# Patient Record
Sex: MALE | Race: BLACK OR AFRICAN AMERICAN
[De-identification: names, ages, dates, MRNs, and addresses within clinical notes are randomized per-mention and may not be internally consistent; named-entity substitution may affect disease eponyms.]

---

## 2018-01-02 ENCOUNTER — CLINICAL ADVICE (OUTPATIENT)
Age: 23
End: 2018-01-02

## 2018-04-20 ENCOUNTER — APPOINTMENT (OUTPATIENT)
Dept: FAMILY MEDICINE | Facility: CLINIC | Age: 23
End: 2018-04-20
Payer: COMMERCIAL

## 2018-04-20 VITALS
DIASTOLIC BLOOD PRESSURE: 62 MMHG | BODY MASS INDEX: 25.48 KG/M2 | SYSTOLIC BLOOD PRESSURE: 102 MMHG | RESPIRATION RATE: 12 BRPM | HEIGHT: 70 IN | HEART RATE: 78 BPM | WEIGHT: 178 LBS | OXYGEN SATURATION: 98 %

## 2018-04-20 LAB
BILIRUB UR QL STRIP: NORMAL
CLARITY UR: CLEAR
GLUCOSE UR-MCNC: NORMAL
HCG UR QL: 0.2 EU/DL
HGB UR QL STRIP.AUTO: NORMAL
KETONES UR-MCNC: NORMAL
LEUKOCYTE ESTERASE UR QL STRIP: NORMAL
NITRITE UR QL STRIP: NORMAL
PH UR STRIP: 6.5
PROT UR STRIP-MCNC: NORMAL
SP GR UR STRIP: 1.02

## 2018-04-20 PROCEDURE — 81003 URINALYSIS AUTO W/O SCOPE: CPT | Mod: QW

## 2018-04-20 PROCEDURE — 99385 PREV VISIT NEW AGE 18-39: CPT | Mod: 25

## 2018-06-25 ENCOUNTER — APPOINTMENT (OUTPATIENT)
Dept: FAMILY MEDICINE | Facility: CLINIC | Age: 23
End: 2018-06-25
Payer: COMMERCIAL

## 2018-06-25 VITALS
HEIGHT: 70 IN | SYSTOLIC BLOOD PRESSURE: 110 MMHG | BODY MASS INDEX: 24.34 KG/M2 | WEIGHT: 170 LBS | DIASTOLIC BLOOD PRESSURE: 70 MMHG

## 2018-06-25 DIAGNOSIS — Z20.9 CONTACT WITH AND (SUSPECTED) EXPOSURE TO UNSPECIFIED COMMUNICABLE DISEASE: ICD-10-CM

## 2018-06-25 DIAGNOSIS — Z00.00 ENCOUNTER FOR GENERAL ADULT MEDICAL EXAMINATION W/OUT ABNORMAL FINDINGS: ICD-10-CM

## 2018-06-25 DIAGNOSIS — R07.81 PLEURODYNIA: ICD-10-CM

## 2018-06-25 PROCEDURE — 36415 COLL VENOUS BLD VENIPUNCTURE: CPT

## 2018-06-25 PROCEDURE — 99213 OFFICE O/P EST LOW 20 MIN: CPT | Mod: 25

## 2018-06-25 NOTE — HISTORY OF PRESENT ILLNESS
[FreeTextEntry1] : Pt is here for follow up. pt gave nurse the name of pediatrician dr marks in [facility],  [facility] shot record [de-identified] : pt here for fu cpe, pt did not go for cxr, pain resolved and after one month, pain was there w deep breathing

## 2018-06-25 NOTE — PHYSICAL EXAM
[Normal Oropharynx] : the oropharynx was normal [Clear to Auscultation] : lungs were clear to auscultation bilaterally [Normal S1, S2] : normal S1 and S2

## 2018-07-17 LAB
ALBUMIN SERPL ELPH-MCNC: 4.3 G/DL
ALP BLD-CCNC: 73 U/L
ALT SERPL-CCNC: 16 U/L
ANION GAP SERPL CALC-SCNC: 10 MMOL/L
AST SERPL-CCNC: 24 U/L
BASOPHILS # BLD AUTO: 0.04 K/UL
BASOPHILS NFR BLD AUTO: 1.4 %
BILIRUB DIRECT SERPL-MCNC: 0.3 MG/DL
BILIRUB INDIRECT SERPL-MCNC: 1.5 MG/DL
BILIRUB SERPL-MCNC: 1.8 MG/DL
BUN SERPL-MCNC: 11 MG/DL
C TRACH RRNA SPEC QL NAA+PROBE: NOT DETECTED
CALCIUM SERPL-MCNC: 9.6 MG/DL
CHLORIDE SERPL-SCNC: 103 MMOL/L
CHOLEST SERPL-MCNC: 171 MG/DL
CHOLEST/HDLC SERPL: 2.1 RATIO
CO2 SERPL-SCNC: 27 MMOL/L
CREAT SERPL-MCNC: 1.32 MG/DL
EOSINOPHIL # BLD AUTO: 0.12 K/UL
EOSINOPHIL NFR BLD AUTO: 4.2 %
GLUCOSE SERPL-MCNC: 79 MG/DL
HCT VFR BLD CALC: 44.9 %
HDLC SERPL-MCNC: 80 MG/DL
HGB BLD-MCNC: 14.9 G/DL
HIV1+2 AB SPEC QL IA.RAPID: NONREACTIVE
IMM GRANULOCYTES NFR BLD AUTO: 0 %
LDLC SERPL CALC-MCNC: 82 MG/DL
LYMPHOCYTES # BLD AUTO: 1.36 K/UL
LYMPHOCYTES NFR BLD AUTO: 47.1 %
MAN DIFF?: NORMAL
MCHC RBC-ENTMCNC: 29.6 PG
MCHC RBC-ENTMCNC: 33.2 GM/DL
MCV RBC AUTO: 89.3 FL
MONOCYTES # BLD AUTO: 0.32 K/UL
MONOCYTES NFR BLD AUTO: 11.1 %
N GONORRHOEA RRNA SPEC QL NAA+PROBE: NOT DETECTED
NEUTROPHILS # BLD AUTO: 1.05 K/UL
NEUTROPHILS NFR BLD AUTO: 36.2 %
PLATELET # BLD AUTO: 191 K/UL
POTASSIUM SERPL-SCNC: 4.4 MMOL/L
PROT SERPL-MCNC: 7.2 G/DL
RBC # BLD: 5.03 M/UL
RBC # FLD: 14.2 %
SODIUM SERPL-SCNC: 140 MMOL/L
SOURCE AMPLIFICATION: NORMAL
TRIGL SERPL-MCNC: 47 MG/DL
TSH SERPL-ACNC: 2.88 UIU/ML
WBC # FLD AUTO: 2.89 K/UL

## 2019-01-08 ENCOUNTER — APPOINTMENT (OUTPATIENT)
Dept: FAMILY MEDICINE | Facility: CLINIC | Age: 24
End: 2019-01-08
Payer: COMMERCIAL

## 2019-01-08 VITALS
HEIGHT: 70 IN | BODY MASS INDEX: 24.2 KG/M2 | OXYGEN SATURATION: 99 % | SYSTOLIC BLOOD PRESSURE: 102 MMHG | WEIGHT: 169 LBS | HEART RATE: 76 BPM | RESPIRATION RATE: 12 BRPM | DIASTOLIC BLOOD PRESSURE: 62 MMHG

## 2019-01-08 DIAGNOSIS — D72.819 DECREASED WHITE BLOOD CELL COUNT, UNSPECIFIED: ICD-10-CM

## 2019-01-08 DIAGNOSIS — J06.9 ACUTE UPPER RESPIRATORY INFECTION, UNSPECIFIED: ICD-10-CM

## 2019-01-08 PROCEDURE — 99213 OFFICE O/P EST LOW 20 MIN: CPT

## 2019-01-08 NOTE — REVIEW OF SYSTEMS
[Fever] : no fever [Earache] : no earache [Chest Pain] : no chest pain [Abdominal Pain] : no abdominal pain [Negative] : Gastrointestinal [FreeTextEntry4] : pt blow out some color

## 2019-01-08 NOTE — HEALTH RISK ASSESSMENT
[No falls in past year] : Patient reported no falls in the past year [] : No [de-identified] : rare

## 2019-01-08 NOTE — HISTORY OF PRESENT ILLNESS
[FreeTextEntry1] : Pt is here for follow up. Pt had blood work in June.  Pt has no complaints at this time. [de-identified] : pt never had recurrence of cp w deep breath ,  never went for cxr, pt here for fu labs - noted wbc was sl low and cr was sl increased but egfr was good\par pt works at [employer], pt works as  and , pt notes no cp, no sob, pt is fighting a cold, blowing out some color but he is doing better, pt notes almost gone. pt here to fu

## 2019-01-08 NOTE — PHYSICAL EXAM
[No Acute Distress] : no acute distress [Normal Oropharynx] : the oropharynx was normal [Clear to Auscultation] : lungs were clear to auscultation bilaterally [Regular Rhythm] : with a regular rhythm [Soft] : abdomen soft

## 2020-12-21 PROBLEM — J06.9 ACUTE URI: Status: RESOLVED | Noted: 2019-01-08 | Resolved: 2020-12-21

## 2024-08-28 ENCOUNTER — INPATIENT (INPATIENT)
Facility: HOSPITAL | Age: 29
LOS: 7 days | Discharge: NURSING, REHAB OR LONG-TERM CARE | DRG: 885 | End: 2024-09-05
Attending: FAMILY MEDICINE | Admitting: HOSPITALIST
Payer: MEDICAID

## 2024-08-28 VITALS — WEIGHT: 179.9 LBS | HEIGHT: 69 IN

## 2024-08-28 DIAGNOSIS — R41.82 ALTERED MENTAL STATUS, UNSPECIFIED: ICD-10-CM

## 2024-08-28 LAB
ALBUMIN SERPL ELPH-MCNC: 4.5 G/DL — SIGNIFICANT CHANGE UP (ref 3.3–5.2)
ALP SERPL-CCNC: 59 U/L — SIGNIFICANT CHANGE UP (ref 40–120)
ALT FLD-CCNC: 34 U/L — SIGNIFICANT CHANGE UP
AMPHET UR-MCNC: NEGATIVE — SIGNIFICANT CHANGE UP
ANION GAP SERPL CALC-SCNC: 20 MMOL/L — HIGH (ref 5–17)
APAP SERPL-MCNC: <3 UG/ML — LOW (ref 10–26)
APPEARANCE UR: CLEAR — SIGNIFICANT CHANGE UP
AST SERPL-CCNC: 65 U/L — HIGH
BACTERIA # UR AUTO: NEGATIVE /HPF — SIGNIFICANT CHANGE UP
BARBITURATES UR SCN-MCNC: NEGATIVE — SIGNIFICANT CHANGE UP
BASOPHILS # BLD AUTO: 0 K/UL — SIGNIFICANT CHANGE UP (ref 0–0.2)
BASOPHILS NFR BLD AUTO: 0 % — SIGNIFICANT CHANGE UP (ref 0–2)
BENZODIAZ UR-MCNC: POSITIVE
BILIRUB SERPL-MCNC: 2.8 MG/DL — HIGH (ref 0.4–2)
BILIRUB UR-MCNC: ABNORMAL
BUN SERPL-MCNC: 14.3 MG/DL — SIGNIFICANT CHANGE UP (ref 8–20)
CALCIUM SERPL-MCNC: 9 MG/DL — SIGNIFICANT CHANGE UP (ref 8.4–10.5)
CAST: 23 /LPF — HIGH (ref 0–4)
CHLORIDE SERPL-SCNC: 103 MMOL/L — SIGNIFICANT CHANGE UP (ref 96–108)
CO2 SERPL-SCNC: 17 MMOL/L — LOW (ref 22–29)
COCAINE METAB.OTHER UR-MCNC: NEGATIVE — SIGNIFICANT CHANGE UP
COLOR SPEC: SIGNIFICANT CHANGE UP
CREAT SERPL-MCNC: 1.26 MG/DL — SIGNIFICANT CHANGE UP (ref 0.5–1.3)
DIFF PNL FLD: NEGATIVE — SIGNIFICANT CHANGE UP
EGFR: 79 ML/MIN/1.73M2 — SIGNIFICANT CHANGE UP
EOSINOPHIL # BLD AUTO: 0.05 K/UL — SIGNIFICANT CHANGE UP (ref 0–0.5)
EOSINOPHIL NFR BLD AUTO: 0.9 % — SIGNIFICANT CHANGE UP (ref 0–6)
ETHANOL SERPL-MCNC: <10 MG/DL — SIGNIFICANT CHANGE UP (ref 0–9)
FENTANYL UR QL SCN: NEGATIVE — SIGNIFICANT CHANGE UP
GIANT PLATELETS BLD QL SMEAR: PRESENT — SIGNIFICANT CHANGE UP
GLUCOSE SERPL-MCNC: 168 MG/DL — HIGH (ref 70–99)
GLUCOSE UR QL: NEGATIVE MG/DL — SIGNIFICANT CHANGE UP
HCT VFR BLD CALC: 41.1 % — SIGNIFICANT CHANGE UP (ref 39–50)
HGB BLD-MCNC: 14.8 G/DL — SIGNIFICANT CHANGE UP (ref 13–17)
KETONES UR-MCNC: 40 MG/DL
LEUKOCYTE ESTERASE UR-ACNC: NEGATIVE — SIGNIFICANT CHANGE UP
LYMPHOCYTES # BLD AUTO: 0.2 K/UL — LOW (ref 1–3.3)
LYMPHOCYTES # BLD AUTO: 3.5 % — LOW (ref 13–44)
MANUAL SMEAR VERIFICATION: SIGNIFICANT CHANGE UP
MCHC RBC-ENTMCNC: 30.7 PG — SIGNIFICANT CHANGE UP (ref 27–34)
MCHC RBC-ENTMCNC: 36 GM/DL — SIGNIFICANT CHANGE UP (ref 32–36)
MCV RBC AUTO: 85.3 FL — SIGNIFICANT CHANGE UP (ref 80–100)
METHADONE UR-MCNC: NEGATIVE — SIGNIFICANT CHANGE UP
MONOCYTES # BLD AUTO: 0.4 K/UL — SIGNIFICANT CHANGE UP (ref 0–0.9)
MONOCYTES NFR BLD AUTO: 6.9 % — SIGNIFICANT CHANGE UP (ref 2–14)
NEUTROPHILS # BLD AUTO: 5.11 K/UL — SIGNIFICANT CHANGE UP (ref 1.8–7.4)
NEUTROPHILS NFR BLD AUTO: 87.8 % — HIGH (ref 43–77)
NITRITE UR-MCNC: NEGATIVE — SIGNIFICANT CHANGE UP
OPIATES UR-MCNC: NEGATIVE — SIGNIFICANT CHANGE UP
PCP SPEC-MCNC: SIGNIFICANT CHANGE UP
PCP UR-MCNC: NEGATIVE — SIGNIFICANT CHANGE UP
PH UR: 6 — SIGNIFICANT CHANGE UP (ref 5–8)
PLAT MORPH BLD: NORMAL — SIGNIFICANT CHANGE UP
PLATELET # BLD AUTO: 186 K/UL — SIGNIFICANT CHANGE UP (ref 150–400)
POTASSIUM SERPL-MCNC: 3.7 MMOL/L — SIGNIFICANT CHANGE UP (ref 3.5–5.3)
POTASSIUM SERPL-SCNC: 3.7 MMOL/L — SIGNIFICANT CHANGE UP (ref 3.5–5.3)
PROT SERPL-MCNC: 7.2 G/DL — SIGNIFICANT CHANGE UP (ref 6.6–8.7)
PROT UR-MCNC: 100 MG/DL
RBC # BLD: 4.82 M/UL — SIGNIFICANT CHANGE UP (ref 4.2–5.8)
RBC # FLD: 13.2 % — SIGNIFICANT CHANGE UP (ref 10.3–14.5)
RBC BLD AUTO: NORMAL — SIGNIFICANT CHANGE UP
RBC CASTS # UR COMP ASSIST: 1 /HPF — SIGNIFICANT CHANGE UP (ref 0–4)
SALICYLATES SERPL-MCNC: <0.6 MG/DL — LOW (ref 10–20)
SODIUM SERPL-SCNC: 140 MMOL/L — SIGNIFICANT CHANGE UP (ref 135–145)
SP GR SPEC: >1.03 — HIGH (ref 1–1.03)
SQUAMOUS # UR AUTO: 2 /HPF — SIGNIFICANT CHANGE UP (ref 0–5)
THC UR QL: POSITIVE
UROBILINOGEN FLD QL: 1 MG/DL — SIGNIFICANT CHANGE UP (ref 0.2–1)
VARIANT LYMPHS # BLD: 0.9 % — SIGNIFICANT CHANGE UP (ref 0–6)
WBC # BLD: 5.82 K/UL — SIGNIFICANT CHANGE UP (ref 3.8–10.5)
WBC # FLD AUTO: 5.82 K/UL — SIGNIFICANT CHANGE UP (ref 3.8–10.5)
WBC UR QL: 5 /HPF — SIGNIFICANT CHANGE UP (ref 0–5)

## 2024-08-28 PROCEDURE — 62270 DX LMBR SPI PNXR: CPT

## 2024-08-28 PROCEDURE — 99285 EMERGENCY DEPT VISIT HI MDM: CPT | Mod: 25

## 2024-08-28 PROCEDURE — 70450 CT HEAD/BRAIN W/O DYE: CPT | Mod: 26,MC

## 2024-08-28 PROCEDURE — 99223 1ST HOSP IP/OBS HIGH 75: CPT

## 2024-08-28 PROCEDURE — 71046 X-RAY EXAM CHEST 2 VIEWS: CPT | Mod: 26

## 2024-08-28 RX ORDER — MIDAZOLAM HYDROCHLORIDE 5 MG/ML
5 INJECTION, SOLUTION INTRAMUSCULAR; INTRAVENOUS ONCE
Refills: 0 | Status: DISCONTINUED | OUTPATIENT
Start: 2024-08-28 | End: 2024-08-28

## 2024-08-28 RX ORDER — HALOPERIDOL 1 MG
5 TABLET ORAL ONCE
Refills: 0 | Status: COMPLETED | OUTPATIENT
Start: 2024-08-28 | End: 2024-08-28

## 2024-08-28 RX ORDER — KETAMINE HYDROCHLORIDE 10 MG/ML
300 INJECTION INTRAMUSCULAR; INTRAVENOUS ONCE
Refills: 0 | Status: DISCONTINUED | OUTPATIENT
Start: 2024-08-28 | End: 2024-08-28

## 2024-08-28 RX ORDER — SODIUM CHLORIDE 9 MG/ML
1000 INJECTION INTRAMUSCULAR; INTRAVENOUS; SUBCUTANEOUS ONCE
Refills: 0 | Status: COMPLETED | OUTPATIENT
Start: 2024-08-28 | End: 2024-08-28

## 2024-08-28 RX ORDER — SODIUM CHLORIDE 9 MG/ML
2000 INJECTION INTRAMUSCULAR; INTRAVENOUS; SUBCUTANEOUS ONCE
Refills: 0 | Status: COMPLETED | OUTPATIENT
Start: 2024-08-28 | End: 2024-08-28

## 2024-08-28 RX ORDER — KETAMINE HYDROCHLORIDE 10 MG/ML
200 INJECTION INTRAMUSCULAR; INTRAVENOUS ONCE
Refills: 0 | Status: DISCONTINUED | OUTPATIENT
Start: 2024-08-28 | End: 2024-08-28

## 2024-08-28 RX ORDER — PIPERACILLIN SODIUM AND TAZOBACTAM SODIUM 3; .375 G/15ML; G/15ML
3.38 INJECTION, POWDER, FOR SOLUTION INTRAVENOUS ONCE
Refills: 0 | Status: COMPLETED | OUTPATIENT
Start: 2024-08-28 | End: 2024-08-28

## 2024-08-28 RX ORDER — VANCOMYCIN/0.9 % SOD CHLORIDE 1.75G/25
1000 PLASTIC BAG, INJECTION (ML) INTRAVENOUS ONCE
Refills: 0 | Status: COMPLETED | OUTPATIENT
Start: 2024-08-28 | End: 2024-08-28

## 2024-08-28 RX ADMIN — SODIUM CHLORIDE 1000 MILLILITER(S): 9 INJECTION INTRAMUSCULAR; INTRAVENOUS; SUBCUTANEOUS at 19:03

## 2024-08-28 RX ADMIN — Medication 5 MILLIGRAM(S): at 14:50

## 2024-08-28 RX ADMIN — MIDAZOLAM HYDROCHLORIDE 5 MILLIGRAM(S): 5 INJECTION, SOLUTION INTRAMUSCULAR; INTRAVENOUS at 19:08

## 2024-08-28 RX ADMIN — Medication 5 MILLIGRAM(S): at 19:08

## 2024-08-28 RX ADMIN — MIDAZOLAM HYDROCHLORIDE 5 MILLIGRAM(S): 5 INJECTION, SOLUTION INTRAMUSCULAR; INTRAVENOUS at 14:50

## 2024-08-28 RX ADMIN — Medication 2000 MILLIGRAM(S): at 23:05

## 2024-08-28 RX ADMIN — PIPERACILLIN SODIUM AND TAZOBACTAM SODIUM 200 GRAM(S): 3; .375 INJECTION, POWDER, FOR SOLUTION INTRAVENOUS at 19:03

## 2024-08-28 RX ADMIN — KETAMINE HYDROCHLORIDE 300 MILLIGRAM(S): 10 INJECTION INTRAMUSCULAR; INTRAVENOUS at 14:56

## 2024-08-28 RX ADMIN — Medication 250 MILLIGRAM(S): at 23:06

## 2024-08-28 RX ADMIN — SODIUM CHLORIDE 2000 MILLILITER(S): 9 INJECTION INTRAMUSCULAR; INTRAVENOUS; SUBCUTANEOUS at 17:00

## 2024-08-28 RX ADMIN — KETAMINE HYDROCHLORIDE 200 MILLIGRAM(S): 10 INJECTION INTRAMUSCULAR; INTRAVENOUS at 14:59

## 2024-08-28 NOTE — ED PROVIDER NOTE - OBJECTIVE STATEMENT
29-year-old male no significant past medical history brought in by EMS for agitation.  PD received a call for unresponsive male.  Patient was found feet first in a dumpster using his shoe as a pillow.  Was unaware of his surroundings, thrashing not alert and oriented, delusional.  Was found to be eating grass as well as his air pods.  Patient required 5 mg of Versed, 5 mg of Haldol IM as well as a total of 500 mg of ketamine IM for safe evaluation.  Patient unable to provide reasonable history 29-year-old male no significant past medical history brought in by EMS for agitation.  PD received a call for unresponsive male.  Patient was found feet first in a dumpster using his shoe as a pillow.  Was unaware of his surroundings, thrashing not alert and oriented, delusional.  Was found to be eating grass as well as his air pods.  Patient required 5 mg of Versed, 5 mg of Haldol IM as well as a total of 500 mg of ketamine IM for safe evaluation.  Patient unable to provide reasonable history. no collateral/family available

## 2024-08-28 NOTE — ED PROVIDER NOTE - PROGRESS NOTE DETAILS
spoke to  MAKAYLA Head. Given patient has elevated CK, cannot yet be evaluated by psych until CK <500. not yet medically clear. given tachycardia and low fariha fever, suspect substance induced vs infection. empiric bcx and abx ordered. additonal IVF ordered. patient signed out to dr mckee Dr. Hernandez: Progress note 2230: Patient signed out to me by Dr. Grimes.  Patient noted to have a fever of 100.6 F during the day.  Patient started on vancomycin, acyclovir, ceftriaxone for possible meningitis.  Will attempt LP.  Patient admitted to the hospital.    Collateral information obtained from sister at bedside Joy Elena [phone], patient lives in [place] and disappeared for few days.  Presumed to have taken bus here.  Patient has never been diagnosed with schizophrenia however high suspicion for underlying psychiatric disorder.

## 2024-08-28 NOTE — ED ADULT TRIAGE NOTE - CHIEF COMPLAINT QUOTE
Pt BIBA for erratic behavior, EMS/PD presumes that patient took some sort of drug, unclear at this time, pt is erratic in his speech, behavior, pt is restrained by EMS and PD during transport to our facility, pt placed in critical 7 and MD Grimes at the bedside. Pt was aggressive and agitated prior to and during transport, of note pt was eating air pods, grass and dirt when pd arrived on scene. Pt is making statements "I am God"

## 2024-08-28 NOTE — H&P ADULT - ASSESSMENT
28 yo male with no reported pmhx presented to the ED by police after being found in a dumpster acting erratically.    Acute Encephalopathy, metabolic vs. autoimmune vs. Psychiatric  SIRS+  -Admit to medicine  -SIRS+ on admission with rectal temp 100.6 and tachycardia   -CXR neg, UA neg, LP negative for nucleated cells, organisms; CSF protein and glucose normal  -Will d/c antibiotics for now and monitor off antibiotics  -Follow up autoimmune CSF studies   -Follow up blood cultures   - to see patient, per ED note, they were consulted but state they cannot eval patient until CK < 500    Elevated CK  -Given 3L IVF  -Repeat in am    DVTppx: SCDs  Dispo: R/o organic causes, then likely dispo to psych if negative 30 yo male with no reported pmhx presented to the ED by police after being found in a dumpster acting erratically.    Acute Encephalopathy, metabolic vs. autoimmune vs. Psychiatric  SIRS+  -Admit to medicine  -SIRS+ on admission with rectal temp 100.6 and tachycardia   -Tox screen showing THC and Benzos (benzos were given in ED)  -CXR neg, UA neg, LP negative for nucleated cells, organisms; CSF protein and glucose normal  -Will d/c antibiotics for now and monitor off antibiotics  -Follow up autoimmune CSF studies   -Follow up blood cultures   - to see patient, per ED note, they were consulted but state they cannot eval patient until CK < 500    Elevated CK  -Given 3L IVF  -Repeat in am    DVTppx: SCDs  Dispo: R/o organic causes, then likely dispo to psych if negative

## 2024-08-28 NOTE — H&P ADULT - NSHPPHYSICALEXAM_GEN_ALL_CORE
Vital Signs Last 24 Hrs  T(C): 38.1 (28 Aug 2024 15:15), Max: 38.1 (28 Aug 2024 15:15)  T(F): 100.6 (28 Aug 2024 15:15), Max: 100.6 (28 Aug 2024 15:15)  HR: 108 (29 Aug 2024 00:30) (95 - 130)  BP: 119/84 (29 Aug 2024 00:30) (118/66 - 144/97)  BP(mean): 81 (28 Aug 2024 19:17) (81 - 108)  RR: 18 (29 Aug 2024 00:30) (16 - 18)  SpO2: 100% (29 Aug 2024 00:30) (99% - 100%)    Parameters below as of 29 Aug 2024 00:30  Patient On (Oxygen Delivery Method): room air    General: Age-appearing, in no acute distress  Head: Normocephalic, atraumatic  ENMT: EOMI, neck supple  Cardiovascular: +S1, S2; Regular rate and rhythm, no murmurs, rubs, gallops  Respiratory: CTA BL, no wheezes, rales, rhonchi  Gastrointestinal: Abdomen soft, non-tender, +BS in all 4 quadrants  Extremities: No clubbing, cyanosis, or edema  Vascular: 2+ pulses, cap refill < 2 seconds  Neuro: Non-focal, AAOx0, sensation intact BL  Musculoskeletal: Normal tone, no deformities  Skin: Warm, dry; no acute rash seen  Psych: Not oriented, opens eyes to verbal stimuli but otherwise not cooperative

## 2024-08-28 NOTE — ED ADULT NURSE NOTE - OBJECTIVE STATEMENT
Pt in no apparent distress at this time. Airway patent, breathing spontaneous and nonlabored. Pt  Awake and alert.   pt found outdoors inside of a garbage can eating grass clippings. pt referring to self as "Jah" and refuses to open his eyes. was noncooperative with ems and PD requiring handcuffs and restraints en route. pt moved to LEIGHTON CARDENAS at bedside to assess.

## 2024-08-28 NOTE — ED PROVIDER NOTE - CLINICAL SUMMARY MEDICAL DECISION MAKING FREE TEXT BOX
History and physical as noted.  Patient required medication for safe evaluation, for patient's safety and staff safety.  Labs CT head urine to evaluate for etiology of new onset psychosis.  Once medically clear, MERCEDEZ louie

## 2024-08-28 NOTE — H&P ADULT - NSICDXNOPASTMEDICALHX_GEN_ALL_CORE
DIABETES FOLLOW UP NOTE: Saw pt earlier today    Chief Complaint: Endocrine consult requested for management of T2DM    INTERVAL HX: Pt stable, tolerating POs with BG levels mostly at goal while on present insulin doses. NPO today> pt states he is very hungry.  No hypoglycemia. On HD.         Review of Systems:  General: As above  Cardiovascular: No chest pain, palpitations  Respiratory: No SOB, no cough  GI: No nausea, vomiting, abdominal pain  Endocrine: No S&Sx of hypoglycemia    Allergies    No Known Allergies    Intolerances      MEDICATIONS:  atorvastatin 40 milliGRAM(s) Oral at bedtime  cholecalciferol 2000 Unit(s) Oral daily  insulin glargine Injectable (LANTUS) 4 Unit(s) SubCutaneous at bedtime  insulin lispro (ADMELOG) corrective regimen sliding scale   SubCutaneous three times a day before meals  insulin lispro (ADMELOG) corrective regimen sliding scale   SubCutaneous at bedtime  insulin lispro Injectable (ADMELOG) 4 Unit(s) SubCutaneous three times a day before meals      PHYSICAL EXAM:  VITALS: T(C): 36.9 (04-19-23 @ 11:10)  T(F): 98.5 (04-19-23 @ 11:10), Max: 98.5 (04-19-23 @ 00:21)  HR: 73 (04-19-23 @ 14:16) (71 - 95)  BP: 137/63 (04-19-23 @ 14:16) (123/64 - 154/78)  RR:  (18 - 18)  SpO2:  (95% - 98%)  Wt(kg): --  GENERAL: Male sitting in chair in NAD  HEENT: R HD cath in place D&I  Abdomen: Soft, nontender, non distended  Extremities: Warm, + edema in LEs> improving. Chronic vascular changes in LEs> Darker skin discoloration with dry/flaky skin. R hand edema   NEURO: A&Ox3       LABS:  POCT Blood Glucose.: 155 mg/dL (04-19-23 @ 16:58)  POCT Blood Glucose.: 161 mg/dL (04-19-23 @ 11:32)  POCT Blood Glucose.: 173 mg/dL (04-19-23 @ 07:56)  POCT Blood Glucose.: 251 mg/dL (04-18-23 @ 22:19)  POCT Blood Glucose.: 185 mg/dL (04-18-23 @ 19:50)  POCT Blood Glucose.: 181 mg/dL (04-18-23 @ 11:13)  POCT Blood Glucose.: 187 mg/dL (04-18-23 @ 08:02)  POCT Blood Glucose.: 128 mg/dL (04-17-23 @ 22:38)  POCT Blood Glucose.: 131 mg/dL (04-17-23 @ 17:17)  POCT Blood Glucose.: 151 mg/dL (04-17-23 @ 13:42)  POCT Blood Glucose.: 209 mg/dL (04-17-23 @ 07:54)  POCT Blood Glucose.: 161 mg/dL (04-16-23 @ 22:03)                            7.9    6.27  )-----------( 43       ( 19 Apr 2023 10:34 )             26.0       04-19    137  |  97  |  51<H>  ----------------------------<  143<H>  4.2   |  26  |  3.99<H>    eGFR: 15<L>    Ca    8.3<L>      04-19  Mg     1.8     04-19  Phos  3.5     04-19    Thyroid Function Tests:  04-10 @ 06:48 TSH 1.51 FreeT4 -- T3 -- Anti TPO -- Anti Thyroglobulin Ab -- TSI --      A1C with Estimated Average Glucose Result: 9.8 % (04-08-23 @ 06:51)      Estimated Average Glucose: 235 mg/dL (04-08-23 @ 06:51)        04-08 Chol 116 Direct LDL -- LDL calculated 61 HDL 39<L> Trig 80               <-- Click to add NO pertinent Past Medical History

## 2024-08-28 NOTE — ED PROVIDER NOTE - PHYSICAL EXAMINATION
PHYSICAL EXAM:   General: appears disheveled  HEENT: NC/AT, PERRLA, airway patent, no cspine tenderness  Cardiovascular: tachycardic rate  Respiratory: clear to auscultation bilaterally anteriorly  Abdominal: soft, nontender, nondistended, no rebound, guarding or rigidity  Extremities: no LE edema b/l.   Psychiatric: see HPI  Neuro: grossly nonfocal  Skin: appropriate color, warm, dry.   -Анна Grimes MD Attending Physician

## 2024-08-28 NOTE — H&P ADULT - NSHPLABSRESULTS_GEN_ALL_CORE
CARDIAC MARKERS ( 28 Aug 2024 15:27 )  x     / x     / x     / x     / 11.3 ng/mL                        14.8   5.82  )-----------( 186      ( 28 Aug 2024 15:27 )             41.1     28 Aug 2024 15:27    140    |  103    |  14.3   ----------------------------<  168    3.7     |  17.0   |  1.26     Ca    9.0        28 Aug 2024 15:27    TPro  7.2    /  Alb  4.5    /  TBili  2.8    /  DBili  x      /  AST  65     /  ALT  34     /  AlkPhos  59     28 Aug 2024 15:27      CAPILLARY BLOOD GLUCOSE    LIVER FUNCTIONS - ( 28 Aug 2024 15:27 )  Alb: 4.5 g/dL / Pro: 7.2 g/dL / ALK PHOS: 59 U/L / ALT: 34 U/L / AST: 65 U/L / GGT: x           Urinalysis Basic - ( 28 Aug 2024 16:00 )    Color: Dark Yellow / Appearance: Clear / SG: >1.030 / pH: x  Gluc: x / Ketone: 40 mg/dL  / Bili: Moderate / Urobili: 1.0 mg/dL   Blood: x / Protein: 100 mg/dL / Nitrite: Negative   Leuk Esterase: Negative / RBC: 1 /HPF / WBC 5 /HPF   Sq Epi: x / Non Sq Epi: 2 /HPF / Bacteria: Negative /HPF    < from: Xray Chest 2 Views PA/Lat (08.28.24 @ 19:32) >    IMPRESSION:  No acute radiographic findings    < end of copied text >    < from: CT Head No Cont (08.28.24 @ 17:46) >    IMPRESSION:   Unremarkable head CT.    < end of copied text >

## 2024-08-28 NOTE — H&P ADULT - HISTORY OF PRESENT ILLNESS
30 yo male with no reported pmhx presented to the ED by police after being found in a dumpster acting erratically. Collateral obtained  from sister, Joy Elena, [phone]. Patient currently resides in [place] with his other sister and her . Per Joy, other sister said that patient began acting odd approximately 1 week ago. He stopped sleeping, was having outbursts of laughing, and telling people he was the second coming of mignon. 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 He then didn't come home for a few days leading up to today. No one is sure how he to [place], presumably by bus. Joy states he has not had similar episodes like this in the past that she is aware of, however, in July of 2023, he did have a suicide attempt by slitting wrists, becoming intoxicated and driving his car. At that time, he spent 3 weeks in a psychiatric facility in [place] and after that, moved to [place] with sister. To Joy's knowledge, patient is not currently taking any medications including psychiatric meds.    When found by PD today, patient was in a dumpster, eating grass clippings and airpods, acting erratic and thrashing. He required handcuffs to be safely brought to the ED. In the ED, was making statements like "I am God" and blessing people the walked by. Patient had received Versed, Haldol, and Ketamine in the ED, and was calm on my evaluation. He opened eyes to verbal stimuli, but otherwise could not participate in interview.

## 2024-08-28 NOTE — H&P ADULT - MLM HIDDEN
PATIENT:  Davida Agudelo   : 1949  Referring physician: Saúl Jordan MD      CHIEF COMPLAINT:   Chief Complaint   Patient presents with   • New Patient     new patient referral neurologist stroke       HISTORY OF PRESENT ILLNESS:  Davida is a 72 year old female with hx of hyperlipidemia, stroke, and TIA/ CVA in  who presents to the office for a consultation for cardiovascular issues.   She was found to have a stroke in 2021 by neurology with a tiny posterior cerebral artery stroke.  Concern was cardiovascular etiology.  Echocardiogram was done with a bubble study which was normal.  CT of the head and MRI of the brain were normal although neurology states that she had a small ischemic area in the posterior cerebral artery region.  30-day event monitor showed short runs of PSVT of less than 10 beats otherwise no atrial fibrillation.  Ultrasound of the neck was negative.  She started on aspirin Plavix and then 2 months later she developed a GI bleed and was found to have cancer in the stomach per the patient.  I do not have those records this was done at her GI doctors office  Pt referred by neuro to establish cardiovascular care due to stroke history.     Presenting for a cardiovascular follow up. The patient has been doing well from a cardiac perspective. No chest pain, shortness of breath, orthopnea or PND. No syncope. The patient is active without any significant symptoms at this time.  Patient is compliant with medical therapy.Medication list reviewed, patient is compliant without side effects.    She is very active.  She has no limiting factors patient exercise regularly.  She is not obese.  He has no other significant history other than above.    PAST MEDICAL HISTORY:    Past Medical History:   Diagnosis Date   • Acute ischemic left posterior cerebral artery stroke (CMS/HCC) 2021   • Anemia    • Gastroesophageal reflux disease    • High cholesterol    • Motion sickness    • TIA  (transient ischemic attack) 07/2021        ALLERGIES:    ALLERGIES:   Allergen Reactions   • Penicillins Other (See Comments)     Throat Swelling   • Chocolate   (Food Or Med) DIZZINESS   • Peanut Oil   (Food Or Med) Other (See Comments)   • Shellfish Allergy   (Food Or Med) Other (See Comments)   • Sulfa Antibiotics Other (See Comments)     Muscle Pain         MEDICATIONS:     Outpatient Medications Prior to Visit   Medication Sig Dispense Refill   • omeprazole (PriLOSEC) 40 MG capsule      • sucralfate (CARAFATE) 1 g tablet Take 1 tablet by mouth 3 times daily. 90 tablet 0   • aspirin 81 MG EC tablet Take 1 tablet by mouth daily. Do not start before July 15, 2021. 90 tablet 3   • atorvastatin (LIPITOR) 40 MG tablet Take 1 tablet by mouth nightly. 90 tablet 3   • pantoprazole (Protonix) 40 MG tablet Take 1 tablet by mouth 2 times daily. 60 tablet 0     No facility-administered medications prior to visit.       SOCIAL HISTORY:    Social History     Tobacco Use   • Smoking status: Never Smoker   • Smokeless tobacco: Never Used   Vaping Use   • Vaping Use: never used   Substance Use Topics   • Alcohol use: Not Currently   • Drug use: Never       FAMILY HISTORY:    Family History   Problem Relation Age of Onset   • Diabetes Mother    • Cancer Father         lung       REVIEW OF SYSTEMS:    Respiratory: Cough: denies.   Constitutional: Fatigue: denies.   Dermatologic: Rash: denies.   Endocrine: Sleep disturbance: denies.   Gastrointestinal: Abdomina pain: denies.   Hematologic: Abnormal bleeding: denies.   Musculoskeletal: Muscle aches: denies.   Neurologic: Dizziness: denies.   Ophthalmologic: Loss of vision: denies.   Psychology: Depression: denies.   Urologic: Blood in urine: denies.   Cardiac: As per HPI    Remainder are reviewed and are negative.       PHYSICAL EXAMINATION:  /80 (BP Location: LUE - Left upper extremity)   Pulse 70   Temp 97.4 °F (36.3 °C) (Temporal)   Resp 16   Ht 5' 4\" (1.626 m)   Wt  59.1 kg (130 lb 4.7 oz)   BMI 22.36 kg/m²   BSA 1.63 m²     Constitutional: appears well-developed and well-nourished. Alert and oriented.   Head: Normocephalic and atraumatic.   Nose: Nose normal.   Mouth/Throat: Mucous membranes are moist.  Normal oropharynx  Eyes: Pupils are equal, round, and reactive to light.   Neck: Normal range of motion. Neck supple. No thyromegaly  Cardiovascular: Regular rate and rhythm, normal S1, S2 no S3 or S4. No murmur, rub or gallops  Pulmonary: Effort normal and breath sounds normal. No wheezes, rales or rhonchi  Abdominal: Soft. Bowel sounds are normal. No hepatosplenomegaly.  Musculoskeletal: Normal range of motion. No tenderness.   Neurological: grossly normal.   Skin: Skin is warm and dry.   Psych: normal mood and affect  Extremities: no significant edema  Pulses: LE DP/PT palpable    I personally reviewed and interpreted recent laboratory values in the chart.   LABS  Hemoglobin A1C (%)   Date Value   07/13/2021 5.0       Cholesterol (mg/dL)   Date Value   07/14/2021 167     HDL (mg/dL)   Date Value   07/14/2021 49 (L)     Triglycerides (mg/dL)   Date Value   07/14/2021 84     LDL (mg/dL)   Date Value   07/14/2021 101       CARDIAC TESTING:   I have reviewed the pertinent imaging study reports. These are the pertinent findings:  · ECG performed and personally reviewed today -11/29/2021 normal EKG  · TTE -   · 7/2021: Left ventricle: The cavity size is normal. Wall thickness is normal. The ejection fraction was measured by biplane method of disks. The ejection fraction is 64%. Impressions: No intra-atrial shunt on color Doppler. If clincal suspicion for paradoxical cardioembolic stroke is high- consider limited echo with bubble study.  · US Carotid -   · 7/2021: 1. No hemodynamically significant stenosis in the carotid arteries by velocity or ratio criteria. By visual inspection and spectral criteria, the patient falls into the lowest category which is less than 50% stenosis.  Additional findings, as above. 2. Antegrade flow is documented in both vertebral arteries.   · 30-day event monitor -   · 8/2021:  1. Sinus rhythm with low grade ventricular and supraventricular ectopy. 2. No significant sustained arrhythmias. 3. No evidence of atrial fibrillation. 4. No correlation of symptoms to any significant arrhythmia.  Short runs PSVT which was noted less  than 10 beats  · CTA Head+Neck -   · 10/2021: 1. No acute intracranial hemorrhage or large territorial infarct. 2. No intracranial large vessel occlusion or significant stenosis. No dissection or hemodynamically significant stenosis by NASCET criteria.   · 7/2021: 1. No evidence acute intracranial process, mass, abnormal enhancement. 2. Moderate stenosis of short segment of the proximal aspect of the P1 segment of the left PCA. The rest of the CTA brain is unremarkable. 3. Markedly limited CTA neck evaluation due to marked venous contrast examination. 4. Please see detailed discussion.  · Stress Test -   · Cardiac MRI -   · Coronary angiography -     ASSESSMENT AND PLAN:    72 year old with history of CVA in 2021, very short PSVT which is asymptomatic and apparently found to have what sounds like gastric cancer    TIA (transient ischemic attack)  Found to have ischemic change on MRI of the brain by neurology.  Work-up has been negative but a bubble study was not performed.  This thought to be embolic and therefore I am recommending a loop recorder for further evaluation.  This been discussed with the patient at length.  She understands and agrees to proceed.  ASA is 2, Mallampati is 2.    Mixed hyperlipidemia  On statin therapy.    Acute ischemic left posterior cerebral artery stroke (CMS/HCC)  Found in July 2021 in setting of dizziness by neurology.  Loop recorder is being recommended.  Other work-up is been negative    Iron deficiency anemia due to chronic blood loss  She was found to have gastric changes consistent with cancer.  Exact  findings not clear to me is as per the patient.    Preoperative evaluation  The patient may need surgery for gastric cancer.  This is not clear what type of cancer it is.  Either way this is a low risk patient for intermediate risk surgery and she is been very active with no limitations and therefore she can proceed.  Aspirin can be held as needed.    Orders Placed This Encounter   • Electrocardiogram 12-Lead   • TRANSTHORACIC ECHO (TTE) LIMITED W/ W/O IMAGING AGENT   • omeprazole (PriLOSEC) 40 MG capsule     Medications Discontinued During This Encounter   Medication Reason   • pantoprazole (Protonix) 40 MG tablet Alternate Therapy     Return in about 4 weeks (around 12/27/2021).     She will see [place] post loop recorder placement and see me in 1 year    Plan of care discussed with the patient.  All questions were answered.  Patient verbalized understanding and agrees with the plan.    Prieto Cha MD, [facility]  Medical Director Congestive Heart Failure  [facility]  [facility]    A letter has been sent to the referring physician.            yes

## 2024-08-29 LAB
ANION GAP SERPL CALC-SCNC: 20 MMOL/L — HIGH (ref 5–17)
APPEARANCE CSF: CLEAR — SIGNIFICANT CHANGE UP
APPEARANCE SPUN FLD: COLORLESS — SIGNIFICANT CHANGE UP
BASOPHILS # BLD AUTO: 0.04 K/UL — SIGNIFICANT CHANGE UP (ref 0–0.2)
BASOPHILS NFR BLD AUTO: 0.6 % — SIGNIFICANT CHANGE UP (ref 0–2)
BUN SERPL-MCNC: 9.6 MG/DL — SIGNIFICANT CHANGE UP (ref 8–20)
C NEOFORM RRNA SPEC NAA+PROBE-ACNC: SIGNIFICANT CHANGE UP
CALCIUM SERPL-MCNC: 8.3 MG/DL — LOW (ref 8.4–10.5)
CHLORIDE SERPL-SCNC: 107 MMOL/L — SIGNIFICANT CHANGE UP (ref 96–108)
CK MB CFR SERPL CALC: 26.8 NG/ML — HIGH (ref 0–6.7)
CK SERPL-CCNC: 4709 U/L — HIGH (ref 30–200)
CMV DNA CSF QL NAA+PROBE: SIGNIFICANT CHANGE UP
CO2 SERPL-SCNC: 16 MMOL/L — LOW (ref 22–29)
COLOR CSF: SIGNIFICANT CHANGE UP
CREAT SERPL-MCNC: 0.92 MG/DL — SIGNIFICANT CHANGE UP (ref 0.5–1.3)
CSF PCR RESULT: SIGNIFICANT CHANGE UP
CULTURE RESULTS: SIGNIFICANT CHANGE UP
E COLI K1 DNA CSF QL NAA+NON-PROBE: SIGNIFICANT CHANGE UP
EGFR: 115 ML/MIN/1.73M2 — SIGNIFICANT CHANGE UP
EOSINOPHIL # BLD AUTO: 0.04 K/UL — SIGNIFICANT CHANGE UP (ref 0–0.5)
EOSINOPHIL NFR BLD AUTO: 0.6 % — SIGNIFICANT CHANGE UP (ref 0–6)
EV RNA CSF QL NAA+PROBE: SIGNIFICANT CHANGE UP
FLUAV AG NPH QL: SIGNIFICANT CHANGE UP
FLUBV AG NPH QL: SIGNIFICANT CHANGE UP
GLUCOSE CSF-MCNC: 68 MG/DLG/24H — SIGNIFICANT CHANGE UP (ref 40–70)
GLUCOSE SERPL-MCNC: 97 MG/DL — SIGNIFICANT CHANGE UP (ref 70–99)
GP B STREP DNA SPEC QL NAA+PROBE: SIGNIFICANT CHANGE UP
GRAM STN FLD: SIGNIFICANT CHANGE UP
GRAM STN FLD: SIGNIFICANT CHANGE UP
HAEM INFLU DNA SPEC QL NAA+PROBE: SIGNIFICANT CHANGE UP
HCT VFR BLD CALC: 41.6 % — SIGNIFICANT CHANGE UP (ref 39–50)
HGB BLD-MCNC: 14.4 G/DL — SIGNIFICANT CHANGE UP (ref 13–17)
HHV6 DNA CSF QL NAA+PROBE: SIGNIFICANT CHANGE UP
HIV 1 & 2 AB SERPL IA.RAPID: SIGNIFICANT CHANGE UP
HSV1 DNA CSF QL NAA+PROBE: SIGNIFICANT CHANGE UP
HSV2 DNA CSF QL NAA+PROBE: SIGNIFICANT CHANGE UP
IMM GRANULOCYTES NFR BLD AUTO: 0.3 % — SIGNIFICANT CHANGE UP (ref 0–0.9)
L MONOCYTOG DNA SPEC QL NAA+PROBE: SIGNIFICANT CHANGE UP
LACTATE BLDV-MCNC: 0.8 MMOL/L — SIGNIFICANT CHANGE UP (ref 0.5–2)
LYMPHOCYTES # BLD AUTO: 0.97 K/UL — LOW (ref 1–3.3)
LYMPHOCYTES # BLD AUTO: 13.7 % — SIGNIFICANT CHANGE UP (ref 13–44)
MCHC RBC-ENTMCNC: 30.2 PG — SIGNIFICANT CHANGE UP (ref 27–34)
MCHC RBC-ENTMCNC: 34.6 GM/DL — SIGNIFICANT CHANGE UP (ref 32–36)
MCV RBC AUTO: 87.2 FL — SIGNIFICANT CHANGE UP (ref 80–100)
MONOCYTES # BLD AUTO: 0.56 K/UL — SIGNIFICANT CHANGE UP (ref 0–0.9)
MONOCYTES NFR BLD AUTO: 7.9 % — SIGNIFICANT CHANGE UP (ref 2–14)
N MEN DNA SPEC QL NAA+PROBE: SIGNIFICANT CHANGE UP
NEUTROPHILS # BLD AUTO: 5.43 K/UL — SIGNIFICANT CHANGE UP (ref 1.8–7.4)
NEUTROPHILS # CSF: SIGNIFICANT CHANGE UP % (ref 0–6)
NEUTROPHILS NFR BLD AUTO: 76.9 % — SIGNIFICANT CHANGE UP (ref 43–77)
NRBC NFR CSF: 0 /UL — SIGNIFICANT CHANGE UP (ref 0–5)
PARECHOVIRUS A RNA SPEC QL NAA+PROBE: SIGNIFICANT CHANGE UP
PLATELET # BLD AUTO: 188 K/UL — SIGNIFICANT CHANGE UP (ref 150–400)
POTASSIUM SERPL-MCNC: 3.8 MMOL/L — SIGNIFICANT CHANGE UP (ref 3.5–5.3)
POTASSIUM SERPL-SCNC: 3.8 MMOL/L — SIGNIFICANT CHANGE UP (ref 3.5–5.3)
PROT CSF-MCNC: 36 MG/DL — SIGNIFICANT CHANGE UP (ref 15–45)
RBC # BLD: 4.77 M/UL — SIGNIFICANT CHANGE UP (ref 4.2–5.8)
RBC # CSF: 0 /CMM — SIGNIFICANT CHANGE UP (ref 0–1)
RBC # FLD: 13.3 % — SIGNIFICANT CHANGE UP (ref 10.3–14.5)
RSV RNA NPH QL NAA+NON-PROBE: SIGNIFICANT CHANGE UP
S PNEUM DNA SPEC QL NAA+PROBE: SIGNIFICANT CHANGE UP
SARS-COV-2 RNA SPEC QL NAA+PROBE: SIGNIFICANT CHANGE UP
SODIUM SERPL-SCNC: 142 MMOL/L — SIGNIFICANT CHANGE UP (ref 135–145)
SPECIMEN SOURCE: SIGNIFICANT CHANGE UP
SPECIMEN SOURCE: SIGNIFICANT CHANGE UP
TUBE TYPE: SIGNIFICANT CHANGE UP
VZV DNA CSF QL NAA+PROBE: SIGNIFICANT CHANGE UP
WBC # BLD: 7.06 K/UL — SIGNIFICANT CHANGE UP (ref 3.8–10.5)
WBC # FLD AUTO: 7.06 K/UL — SIGNIFICANT CHANGE UP (ref 3.8–10.5)

## 2024-08-29 PROCEDURE — 99233 SBSQ HOSP IP/OBS HIGH 50: CPT

## 2024-08-29 PROCEDURE — 99223 1ST HOSP IP/OBS HIGH 75: CPT

## 2024-08-29 RX ORDER — ONDANSETRON 2 MG/ML
4 INJECTION, SOLUTION INTRAMUSCULAR; INTRAVENOUS EVERY 8 HOURS
Refills: 0 | Status: DISCONTINUED | OUTPATIENT
Start: 2024-08-29 | End: 2024-09-05

## 2024-08-29 RX ORDER — KETAMINE HYDROCHLORIDE 10 MG/ML
300 INJECTION INTRAMUSCULAR; INTRAVENOUS ONCE
Refills: 0 | Status: DISCONTINUED | OUTPATIENT
Start: 2024-08-29 | End: 2024-08-29

## 2024-08-29 RX ORDER — VANCOMYCIN/0.9 % SOD CHLORIDE 1.75G/25
1500 PLASTIC BAG, INJECTION (ML) INTRAVENOUS EVERY 12 HOURS
Refills: 0 | Status: DISCONTINUED | OUTPATIENT
Start: 2024-08-29 | End: 2024-08-29

## 2024-08-29 RX ORDER — MAGNESIUM, ALUMINUM HYDROXIDE 200-225/5
30 SUSPENSION, ORAL (FINAL DOSE FORM) ORAL EVERY 4 HOURS
Refills: 0 | Status: DISCONTINUED | OUTPATIENT
Start: 2024-08-29 | End: 2024-09-05

## 2024-08-29 RX ORDER — VANCOMYCIN/0.9 % SOD CHLORIDE 1.75G/25
1750 PLASTIC BAG, INJECTION (ML) INTRAVENOUS EVERY 12 HOURS
Refills: 0 | Status: DISCONTINUED | OUTPATIENT
Start: 2024-08-29 | End: 2024-08-29

## 2024-08-29 RX ORDER — LORAZEPAM 4 MG/ML
2 INJECTION INTRAMUSCULAR; INTRAVENOUS EVERY 6 HOURS
Refills: 0 | Status: DISCONTINUED | OUTPATIENT
Start: 2024-08-29 | End: 2024-08-29

## 2024-08-29 RX ORDER — ACETAMINOPHEN 325 MG/1
650 TABLET ORAL EVERY 6 HOURS
Refills: 0 | Status: DISCONTINUED | OUTPATIENT
Start: 2024-08-29 | End: 2024-09-05

## 2024-08-29 RX ADMIN — KETAMINE HYDROCHLORIDE 300 MILLIGRAM(S): 10 INJECTION INTRAMUSCULAR; INTRAVENOUS at 00:00

## 2024-08-29 RX ADMIN — Medication 266 MILLIGRAM(S): at 01:30

## 2024-08-29 RX ADMIN — KETAMINE HYDROCHLORIDE 300 MILLIGRAM(S): 10 INJECTION INTRAMUSCULAR; INTRAVENOUS at 03:06

## 2024-08-29 NOTE — CHART NOTE - NSCHARTNOTEFT_GEN_A_CORE
Patient woke up and attempted to elope out of ambulance bay. Code gray called, patient unable to be verbally redirected, unable to verbally deescalate. Given 300mg Ketamine IM. Inpatient team informed of events.

## 2024-08-29 NOTE — BH CONSULTATION LIAISON ASSESSMENT NOTE - NSBHCHARTREVIEWLAB_PSY_A_CORE FT
Creatine Kinase in AM (08.29.24 @ 03:35)   Creatine Kinase: 4709 U/L    THC, Urine Qualitative: Positive (08.28.24 @ 16:00)

## 2024-08-29 NOTE — BH CONSULTATION LIAISON ASSESSMENT NOTE - ATTENDING COMMENTS
Patient seen and assessed by writer as well.  Case was discussed at length with ACP.  I agree with NP's assessment and above recommendations.

## 2024-08-29 NOTE — ED PROCEDURE NOTE - ATTENDING CONTRIBUTION TO CARE
I have personally performed a history and physical examination of the patient and discussed management with the resident as well as the patient.  I reviewed the resident's note and agree with the documented findings and plan of care.  I have authored and modified critical sections of the Provider Note, including but not limited to HPI, Physical Exam and MDM.    Uncomplicated LP.  Patient altered and severely agitated, given ketamine for anxiolysis during procedure.

## 2024-08-29 NOTE — BH CONSULTATION LIAISON ASSESSMENT NOTE - NSBHATTESTAPPBILLTIME_PSY_A_CORE
I attest my time as TERRANCE is greater than 50% of the total combined time spent on qualifying patient care activities. I have reviewed and verified the documentation.

## 2024-08-29 NOTE — BH CONSULTATION LIAISON ASSESSMENT NOTE - NSBHCHARTREVIEWVS_PSY_A_CORE FT
Vital Signs Last 24 Hrs  T(C): 36.4 (29 Aug 2024 10:00), Max: 38.1 (28 Aug 2024 15:15)  T(F): 97.6 (29 Aug 2024 10:00), Max: 100.6 (28 Aug 2024 15:15)  HR: 94 (29 Aug 2024 10:00) (76 - 130)  BP: 135/72 (29 Aug 2024 10:00) (111/70 - 150/98)  BP(mean): 81 (28 Aug 2024 19:17) (81 - 108)  RR: 18 (29 Aug 2024 10:00) (12 - 18)  SpO2: 100% (29 Aug 2024 10:00) (99% - 100%)    Parameters below as of 29 Aug 2024 10:00  Patient On (Oxygen Delivery Method): room air

## 2024-08-29 NOTE — BH CONSULTATION LIAISON ASSESSMENT NOTE - RISK ASSESSMENT
RF  past hx of suicide attempt  past hx of inpatient hospitalization  non-compliance with psych tx  hx of MADYSON    PF  family
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
## 2024-08-29 NOTE — BH CONSULTATION LIAISON ASSESSMENT NOTE - CURRENT MEDICATION
MEDICATIONS  (STANDING):  lactated ringers. 1000 milliLiter(s) (150 mL/Hr) IV Continuous <Continuous>    MEDICATIONS  (PRN):  acetaminophen     Tablet .. 650 milliGRAM(s) Oral every 6 hours PRN Temp greater or equal to 38C (100.4F), Mild Pain (1 - 3)  aluminum hydroxide/magnesium hydroxide/simethicone Suspension 30 milliLiter(s) Oral every 4 hours PRN Dyspepsia  LORazepam   Injectable 2 milliGRAM(s) IV Push every 6 hours PRN Agitation  melatonin 3 milliGRAM(s) Oral at bedtime PRN Insomnia  ondansetron Injectable 4 milliGRAM(s) IV Push every 8 hours PRN Nausea and/or Vomiting
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
## 2024-08-29 NOTE — PATIENT PROFILE ADULT - FALL HARM RISK - UNIVERSAL INTERVENTIONS
Bed in lowest position, wheels locked, appropriate side rails in place/Call bell, personal items and telephone in reach/Instruct patient to call for assistance before getting out of bed or chair/Non-slip footwear when patient is out of bed/[place] to call system/Physically safe environment - no spills, clutter or unnecessary equipment/Purposeful Proactive Rounding/Room/bathroom lighting operational, light cord in reach

## 2024-08-29 NOTE — BH CONSULTATION LIAISON ASSESSMENT NOTE - LEVEL OF CONSCIOUSNESS
[facility]    History and Physical    Patient Name: Lila Pabon  :  1954  MRN:  [id number]  Date of Admission: 2020    Subjective     Patient is a 65 y.o. female presents with chief complaint of chronic low back, hips: left, buttock and knee: left pain.  Onset of symptoms was gradual starting several years ago.  Symptoms are associated/aggravated by nothing in particular. Symptoms improve with PT may help some     MRI impression:  1. Redemonstration of grade 1 anterolisthesis of L4 on L5 secondary to severe facet arthritis. Combination of findings at this level result in moderate to severe canal stenosis.  2. At L3-4, there is mild central canal stenosis with mass effect on left greater the right lateral recess. There is an associated left-sided intracanalicular synovial cyst contributing to the focal mass effect on the left lateral recess.  3. Multilevel facet arthritis and synovial cyst formation noted.    The following portions of the patients history were reviewed and updated as appropriate: current medications, allergies, past medical history, past family history, past social history and problem list                Objective     Past Medical History:   Past Medical History:   Diagnosis Date   • Anemia     IRON SUPPLEMENTS   • Arthritis    • Colon polyp    • Esophagitis 2018   • GERD (gastroesophageal reflux disease)    • Hypertension    • Stress-induced cardiomyopathy 1/15/2019   • Type 2 myocardial infarction (CMS/HCC) 1/15/2019     Past Surgical History:   Past Surgical History:   Procedure Laterality Date   • CARDIAC CATHETERIZATION N/A 2018    Procedure: Left Heart Cath and Cors;  Surgeon: Suhas Almonte MD;  Location: [facility] CATH INVASIVE LOCATION;  Service: Cardiovascular   • CARDIAC CATHETERIZATION N/A 2018    Procedure: Left ventriculography;  Surgeon: Suhas Almonte MD;  Location: [facility] CATH INVASIVE LOCATION;  Service: Cardiovascular   • CARDIAC  CATHETERIZATION N/A 12/10/2018    Procedure: Right and Left Heart Cath;  Surgeon: Laquita Jessica MD;  Location:  [facility] CATH INVASIVE LOCATION;  Service: Cardiology   • CARDIAC CATHETERIZATION N/A 12/10/2018    Procedure: Coronary angiography;  Surgeon: Laquita Jessica MD;  Location:  [facility] CATH INVASIVE LOCATION;  Service: Cardiology   • CARDIAC CATHETERIZATION N/A 12/10/2018    Procedure: Left ventriculography;  Surgeon: Laquita Jessica MD;  Location:  [facility] CATH INVASIVE LOCATION;  Service: Cardiology   • CATARACT EXTRACTION Bilateral    • CATARACT EXTRACTION     • COLONOSCOPY     • ENDOSCOPY N/A 2018    Procedure: ESOPHAGOGASTRODUODENOSCOPY with biopsies;  Surgeon: Rupa Hays MD;  Location:  [facility] ENDOSCOPY;  Service: Gastroenterology   • ENDOSCOPY N/A 2018    Procedure: ESOPHAGOGASTRODUODENOSCOPY with biopsies;  Surgeon: Rupa Hays MD;  Location:  [facility] ENDOSCOPY;  Service: Gastroenterology   • ENDOSCOPY N/A 2018    Procedure: ESOPHAGOGASTRODUODENOSCOPY JWITH BIOPSIES;  Surgeon: Laureano Rehman MD;  Location: [facility] OR;  Service: Gastroenterology   • GLAUCOMA SURGERY     • HYSTERECTOMY     • SHOULDER SURGERY Right     RUPTURED BICEP TENDON   • SHOULDER SURGERY       Family History:   Family History   Problem Relation Age of Onset   • Breast cancer Mother    • Breast cancer Maternal Aunt    • Colon polyps Son    • Colon cancer Neg Hx      Social History:   Social History     Tobacco Use   • Smoking status: Former Smoker     Years: 44.00     Types: Cigarettes     Last attempt to quit: 2010     Years since quittin.1   • Smokeless tobacco: Never Used   Substance Use Topics   • Alcohol use: Yes     Comment: Rare   • Drug use: No       Vital Signs Range for the last 24 hours  Temperature: Temp:  [36.6 °C (97.8 °F)] 36.6 °C (97.8 °F)   Temp Source: Temp src: Oral   BP: BP: (115)/(76) 115/76   Pulse: Heart Rate:  [55] 55   Respirations: Resp:  [16] 16   SPO2: SpO2:   "[100 %] 100 %   O2 Amount (l/min):     O2 Devices Device (Oxygen Therapy): room air   Weight: Weight:  [56.7 kg (125 lb)] 56.7 kg (125 lb)     Flowsheet Rows      First Filed Value   Admission Height  162.6 cm (64\") Documented at 01/27/2020 0758   Admission Weight  56.7 kg (125 lb) Documented at 01/27/2020 0758          --------------------------------------------------------------------------------    Current Outpatient Medications   Medication Sig Dispense Refill   • calcium carb-cholecalciferol (CALCIUM 600+D) 600-800 MG-UNIT tablet Take 1 tablet by mouth Every Night.     • docusate sodium (COLACE) 100 MG capsule Take 100 mg by mouth Every Night.     • estrogens, conjugated, (PREMARIN) 0.45 MG tablet Take 0.45 mg by mouth Every Night. Take daily for 21 days then do not take for 7 days.      • ferrous sulfate 140 (45 Fe) MG tablet controlled-release tablet Take 140 mg by mouth Daily With Breakfast.     • fluticasone (FLONASE) 50 MCG/ACT nasal spray 2 sprays into the nostril(s) as directed by provider As Needed for Rhinitis.     • losartan (COZAAR) 25 MG tablet Take 25 mg by mouth Daily.     • pantoprazole (PROTONIX) 40 MG EC tablet Take 1 tablet by mouth Daily. 90 tablet 3   • sucralfate (CARAFATE) 1 g tablet Take 1 tablet by mouth 4 (Four) Times a Day Before Meals & at Bedtime As Needed (Heartburn). 120 tablet 10   • traZODone (DESYREL) 100 MG tablet Take 100 mg by mouth Every Night.     • aspirin 81 MG chewable tablet Chew 81 mg Daily.       Current Facility-Administered Medications   Medication Dose Route Frequency Provider Last Rate Last Dose   • sodium chloride 0.9 % flush 3 mL  3 mL Intravenous Q12H Hawk Amador MD       • sodium chloride 0.9 % flush 3-10 mL  3-10 mL Intravenous PRN Hawk Amador MD           --------------------------------------------------------------------------------  Assessment/Plan      Anesthesia Evaluation     no history of anesthetic complications:  NPO Solid Status: > 8 " hours      Pain impairs ability to perform ADLs: Working, Sleeping and Exercise/Activity  Modalities previously tried to control pain with limited effectiveness within the last 4-6 weeks: Physical therapy     Airway   Mallampati: II  TM distance: >3 FB  Neck ROM: full  Dental      Pulmonary - normal exam   (+) a smoker Former,   Cardiovascular     ECG reviewed  Rhythm: regular    (+) hypertension, past MI  >12 months, murmur,     ROS comment: ? Hx of cardiomyopathy- pt w no rales or orthopnea, EF 60%  PE comment: DP post bilatereally.    Neuro/Psych  normal reflexes and symmetric  (-) left straight leg raise test, right straight leg raise test  GI/Hepatic/Renal/Endo    (+)  GERD,      Musculoskeletal     (+) KIM test,       PE comment: Pain in L post/lat hip w KIM  Abdominal    Substance History      OB/GYN          Other            Phys Exam Other: She does do physical therapy with mild relief of her discomfort           Diagnosis and Plan    Treatment Plan  ASA 2      Procedures: Lumbar Epidural Steroid Injection(LESI), With fluoroscopy,           Plan lumbar epidural steroid injection under fluoroscopic guidance.  Patient understands the risks and benefits including but not limited to bleeding, infection, dural puncture headache, inadvertent spinal anesthetic and allergic adverse reaction to local anesthetic or steroid.  She also understands there is possibly a small cumulative risk of increased risk of compression fractures in the future.  She does have a history of possible cardiomyopathy but her ejection fraction at this time is good.  She understands this only affects inflammation and does not cure any mechanical problems      Diagnosis     * Lumbar neuritis [M54.16]     * Lumbago [M54.5]     * Degeneration of lumbar intervertebral disc [M51.36]                     Alert

## 2024-08-30 LAB
ALBUMIN SERPL ELPH-MCNC: 3.5 G/DL — SIGNIFICANT CHANGE UP (ref 3.3–5.2)
ALP SERPL-CCNC: 50 U/L — SIGNIFICANT CHANGE UP (ref 40–120)
ALT FLD-CCNC: 39 U/L — SIGNIFICANT CHANGE UP
ANION GAP SERPL CALC-SCNC: 13 MMOL/L — SIGNIFICANT CHANGE UP (ref 5–17)
ANISOCYTOSIS BLD QL: SLIGHT — SIGNIFICANT CHANGE UP
AST SERPL-CCNC: 89 U/L — HIGH
BASOPHILS # BLD AUTO: 0.06 K/UL — SIGNIFICANT CHANGE UP (ref 0–0.2)
BASOPHILS NFR BLD AUTO: 1.8 % — SIGNIFICANT CHANGE UP (ref 0–2)
BILIRUB SERPL-MCNC: 1.6 MG/DL — SIGNIFICANT CHANGE UP (ref 0.4–2)
BUN SERPL-MCNC: 5.5 MG/DL — LOW (ref 8–20)
CALCIUM SERPL-MCNC: 8.9 MG/DL — SIGNIFICANT CHANGE UP (ref 8.4–10.5)
CHLORIDE SERPL-SCNC: 106 MMOL/L — SIGNIFICANT CHANGE UP (ref 96–108)
CK MB CFR SERPL CALC: 8.9 NG/ML — HIGH (ref 0–6.7)
CK SERPL-CCNC: 3501 U/L — HIGH (ref 30–200)
CO2 SERPL-SCNC: 23 MMOL/L — SIGNIFICANT CHANGE UP (ref 22–29)
CREAT SERPL-MCNC: 1.08 MG/DL — SIGNIFICANT CHANGE UP (ref 0.5–1.3)
EGFR: 95 ML/MIN/1.73M2 — SIGNIFICANT CHANGE UP
EOSINOPHIL # BLD AUTO: 0.05 K/UL — SIGNIFICANT CHANGE UP (ref 0–0.5)
EOSINOPHIL NFR BLD AUTO: 1.7 % — SIGNIFICANT CHANGE UP (ref 0–6)
GIANT PLATELETS BLD QL SMEAR: PRESENT — SIGNIFICANT CHANGE UP
GLUCOSE SERPL-MCNC: 88 MG/DL — SIGNIFICANT CHANGE UP (ref 70–99)
HCT VFR BLD CALC: 37.7 % — LOW (ref 39–50)
HGB BLD-MCNC: 13.2 G/DL — SIGNIFICANT CHANGE UP (ref 13–17)
LDH CSF L TO P-CCNC: <10 U/L — SIGNIFICANT CHANGE UP
LDH FLD-CCNC: <10 U/L — SIGNIFICANT CHANGE UP
LYMPHOCYTES # BLD AUTO: 0.88 K/UL — LOW (ref 1–3.3)
LYMPHOCYTES # BLD AUTO: 27.2 % — SIGNIFICANT CHANGE UP (ref 13–44)
MAGNESIUM SERPL-MCNC: 1.9 MG/DL — SIGNIFICANT CHANGE UP (ref 1.6–2.6)
MANUAL SMEAR VERIFICATION: SIGNIFICANT CHANGE UP
MCHC RBC-ENTMCNC: 30.8 PG — SIGNIFICANT CHANGE UP (ref 27–34)
MCHC RBC-ENTMCNC: 35 GM/DL — SIGNIFICANT CHANGE UP (ref 32–36)
MCV RBC AUTO: 87.9 FL — SIGNIFICANT CHANGE UP (ref 80–100)
MONOCYTES # BLD AUTO: 0.28 K/UL — SIGNIFICANT CHANGE UP (ref 0–0.9)
MONOCYTES NFR BLD AUTO: 8.8 % — SIGNIFICANT CHANGE UP (ref 2–14)
NEUTROPHILS # BLD AUTO: 1.87 K/UL — SIGNIFICANT CHANGE UP (ref 1.8–7.4)
NEUTROPHILS NFR BLD AUTO: 57.9 % — SIGNIFICANT CHANGE UP (ref 43–77)
PHOSPHATE SERPL-MCNC: 2.7 MG/DL — SIGNIFICANT CHANGE UP (ref 2.4–4.7)
PLAT MORPH BLD: NORMAL — SIGNIFICANT CHANGE UP
PLATELET # BLD AUTO: 165 K/UL — SIGNIFICANT CHANGE UP (ref 150–400)
POIKILOCYTOSIS BLD QL AUTO: SLIGHT — SIGNIFICANT CHANGE UP
POLYCHROMASIA BLD QL SMEAR: SLIGHT — SIGNIFICANT CHANGE UP
POTASSIUM SERPL-MCNC: 3.9 MMOL/L — SIGNIFICANT CHANGE UP (ref 3.5–5.3)
POTASSIUM SERPL-SCNC: 3.9 MMOL/L — SIGNIFICANT CHANGE UP (ref 3.5–5.3)
PROT SERPL-MCNC: 5.8 G/DL — LOW (ref 6.6–8.7)
RBC # BLD: 4.29 M/UL — SIGNIFICANT CHANGE UP (ref 4.2–5.8)
RBC # FLD: 13.2 % — SIGNIFICANT CHANGE UP (ref 10.3–14.5)
RBC BLD AUTO: ABNORMAL
SMUDGE CELLS # BLD: PRESENT — SIGNIFICANT CHANGE UP
SODIUM SERPL-SCNC: 142 MMOL/L — SIGNIFICANT CHANGE UP (ref 135–145)
VARIANT LYMPHS # BLD: 2.6 % — SIGNIFICANT CHANGE UP (ref 0–6)
WBC # BLD: 3.23 K/UL — LOW (ref 3.8–10.5)
WBC # FLD AUTO: 3.23 K/UL — LOW (ref 3.8–10.5)

## 2024-08-30 PROCEDURE — 99233 SBSQ HOSP IP/OBS HIGH 50: CPT

## 2024-08-30 RX ORDER — RISPERIDONE 0.25 MG/1
0.5 TABLET, FILM COATED ORAL
Refills: 0 | Status: DISCONTINUED | OUTPATIENT
Start: 2024-08-30 | End: 2024-09-05

## 2024-08-30 RX ORDER — HALOPERIDOL 1 MG
5 TABLET ORAL EVERY 6 HOURS
Refills: 0 | Status: DISCONTINUED | OUTPATIENT
Start: 2024-08-30 | End: 2024-09-05

## 2024-08-30 RX ADMIN — Medication 150 MILLILITER(S): at 16:01

## 2024-08-30 RX ADMIN — Medication 150 MILLILITER(S): at 20:24

## 2024-08-30 RX ADMIN — RISPERIDONE 0.5 MILLIGRAM(S): 0.25 TABLET, FILM COATED ORAL at 18:56
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
## 2024-08-30 NOTE — PROGRESS NOTE ADULT - TIME BILLING
Chart, labs, orders, vitals, and imaging reviewed and plan of care discussed with consultants and IDR team in detail. Plan of care discussed with patient at bedside.
Time spent reviewing the chart documentation, reviewing labs and imaging studies, evaluating the patient, discussing the plan of care with the consultants & medical team, and documenting.
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
## 2024-08-31 LAB
ANION GAP SERPL CALC-SCNC: 7 MMOL/L — SIGNIFICANT CHANGE UP (ref 5–17)
BUN SERPL-MCNC: 5.7 MG/DL — LOW (ref 8–20)
CALCIUM SERPL-MCNC: 8.7 MG/DL — SIGNIFICANT CHANGE UP (ref 8.4–10.5)
CHLORIDE SERPL-SCNC: 104 MMOL/L — SIGNIFICANT CHANGE UP (ref 96–108)
CK MB CFR SERPL CALC: 4.9 NG/ML — SIGNIFICANT CHANGE UP (ref 0–6.7)
CK SERPL-CCNC: 3104 U/L — HIGH (ref 30–200)
CO2 SERPL-SCNC: 28 MMOL/L — SIGNIFICANT CHANGE UP (ref 22–29)
CREAT SERPL-MCNC: 0.97 MG/DL — SIGNIFICANT CHANGE UP (ref 0.5–1.3)
EGFR: 108 ML/MIN/1.73M2 — SIGNIFICANT CHANGE UP
GLUCOSE SERPL-MCNC: 100 MG/DL — HIGH (ref 70–99)
POTASSIUM SERPL-MCNC: 4 MMOL/L — SIGNIFICANT CHANGE UP (ref 3.5–5.3)
POTASSIUM SERPL-SCNC: 4 MMOL/L — SIGNIFICANT CHANGE UP (ref 3.5–5.3)
SODIUM SERPL-SCNC: 139 MMOL/L — SIGNIFICANT CHANGE UP (ref 135–145)

## 2024-08-31 PROCEDURE — 99232 SBSQ HOSP IP/OBS MODERATE 35: CPT

## 2024-08-31 RX ADMIN — Medication 150 MILLILITER(S): at 03:13

## 2024-08-31 RX ADMIN — Medication 100 MILLILITER(S): at 23:08

## 2024-08-31 RX ADMIN — Medication 150 MILLILITER(S): at 13:11

## 2024-08-31 RX ADMIN — RISPERIDONE 0.5 MILLIGRAM(S): 0.25 TABLET, FILM COATED ORAL at 17:25

## 2024-08-31 RX ADMIN — RISPERIDONE 0.5 MILLIGRAM(S): 0.25 TABLET, FILM COATED ORAL at 05:40

## 2024-09-01 LAB
CK MB CFR SERPL CALC: 2.4 NG/ML — SIGNIFICANT CHANGE UP (ref 0–6.7)
CK SERPL-CCNC: 1766 U/L — HIGH (ref 30–200)
HCT VFR BLD CALC: 37.7 % — LOW (ref 39–50)
HGB BLD-MCNC: 13.3 G/DL — SIGNIFICANT CHANGE UP (ref 13–17)
MCHC RBC-ENTMCNC: 30.4 PG — SIGNIFICANT CHANGE UP (ref 27–34)
MCHC RBC-ENTMCNC: 35.3 GM/DL — SIGNIFICANT CHANGE UP (ref 32–36)
MCV RBC AUTO: 86.3 FL — SIGNIFICANT CHANGE UP (ref 80–100)
PLATELET # BLD AUTO: 169 K/UL — SIGNIFICANT CHANGE UP (ref 150–400)
RBC # BLD: 4.37 M/UL — SIGNIFICANT CHANGE UP (ref 4.2–5.8)
RBC # FLD: 13.2 % — SIGNIFICANT CHANGE UP (ref 10.3–14.5)
WBC # BLD: 3.25 K/UL — LOW (ref 3.8–10.5)
WBC # FLD AUTO: 3.25 K/UL — LOW (ref 3.8–10.5)

## 2024-09-01 PROCEDURE — 99231 SBSQ HOSP IP/OBS SF/LOW 25: CPT

## 2024-09-01 RX ADMIN — RISPERIDONE 0.5 MILLIGRAM(S): 0.25 TABLET, FILM COATED ORAL at 05:49

## 2024-09-01 RX ADMIN — Medication 100 MILLILITER(S): at 20:46

## 2024-09-01 RX ADMIN — RISPERIDONE 0.5 MILLIGRAM(S): 0.25 TABLET, FILM COATED ORAL at 17:16

## 2024-09-01 RX ADMIN — Medication 100 MILLILITER(S): at 09:23
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
## 2024-09-02 LAB
CK MB CFR SERPL CALC: 1.6 NG/ML — SIGNIFICANT CHANGE UP (ref 0–6.7)
CK SERPL-CCNC: 1092 U/L — HIGH (ref 30–200)

## 2024-09-02 PROCEDURE — 99231 SBSQ HOSP IP/OBS SF/LOW 25: CPT

## 2024-09-02 RX ADMIN — Medication 100 MILLILITER(S): at 21:14

## 2024-09-02 RX ADMIN — RISPERIDONE 0.5 MILLIGRAM(S): 0.25 TABLET, FILM COATED ORAL at 05:48

## 2024-09-02 RX ADMIN — Medication 100 MILLILITER(S): at 17:22

## 2024-09-02 RX ADMIN — RISPERIDONE 0.5 MILLIGRAM(S): 0.25 TABLET, FILM COATED ORAL at 17:22

## 2024-09-03 LAB
CK MB CFR SERPL CALC: 1.2 NG/ML — SIGNIFICANT CHANGE UP (ref 0–6.7)
CK MB CFR SERPL CALC: 1.3 NG/ML — SIGNIFICANT CHANGE UP (ref 0–6.7)
CK SERPL-CCNC: 562 U/L — HIGH (ref 30–200)
CK SERPL-CCNC: 652 U/L — HIGH (ref 30–200)
CULTURE RESULTS: NO GROWTH — SIGNIFICANT CHANGE UP
CULTURE RESULTS: SIGNIFICANT CHANGE UP
CULTURE RESULTS: SIGNIFICANT CHANGE UP
SPECIMEN SOURCE: SIGNIFICANT CHANGE UP
WNV IGG CSF IA-ACNC: NEGATIVE — SIGNIFICANT CHANGE UP
WNV IGM CSF IA-ACNC: NEGATIVE — SIGNIFICANT CHANGE UP

## 2024-09-03 PROCEDURE — 99232 SBSQ HOSP IP/OBS MODERATE 35: CPT

## 2024-09-03 PROCEDURE — 99231 SBSQ HOSP IP/OBS SF/LOW 25: CPT

## 2024-09-03 RX ADMIN — RISPERIDONE 0.5 MILLIGRAM(S): 0.25 TABLET, FILM COATED ORAL at 17:09

## 2024-09-03 RX ADMIN — RISPERIDONE 0.5 MILLIGRAM(S): 0.25 TABLET, FILM COATED ORAL at 05:40
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
## 2024-09-04 ENCOUNTER — TRANSCRIPTION ENCOUNTER (OUTPATIENT)
Age: 29
End: 2024-09-04

## 2024-09-04 LAB
CK MB CFR SERPL CALC: 1.1 NG/ML — SIGNIFICANT CHANGE UP (ref 0–6.7)
CK SERPL-CCNC: 428 U/L — HIGH (ref 30–200)
SARS-COV-2 RNA SPEC QL NAA+PROBE: SIGNIFICANT CHANGE UP

## 2024-09-04 PROCEDURE — 99231 SBSQ HOSP IP/OBS SF/LOW 25: CPT

## 2024-09-04 PROCEDURE — 93010 ELECTROCARDIOGRAM REPORT: CPT

## 2024-09-04 PROCEDURE — 99232 SBSQ HOSP IP/OBS MODERATE 35: CPT

## 2024-09-04 RX ADMIN — RISPERIDONE 0.5 MILLIGRAM(S): 0.25 TABLET, FILM COATED ORAL at 06:11

## 2024-09-04 RX ADMIN — RISPERIDONE 0.5 MILLIGRAM(S): 0.25 TABLET, FILM COATED ORAL at 17:42

## 2024-09-04 RX ADMIN — Medication 100 MILLILITER(S): at 17:42

## 2024-09-04 RX ADMIN — Medication 100 MILLILITER(S): at 00:05

## 2024-09-04 NOTE — DISCHARGE NOTE PROVIDER - CARE PROVIDER_API CALL
PCP,   Phone: (   )    -  Fax: (   )    -  Follow Up Time: 1 week    Psychiatry,   Phone: (   )    -  Fax: (   )    -  Follow Up Time: 1 week

## 2024-09-04 NOTE — BH CONSULTATION LIAISON PROGRESS NOTE - NSBHCHARTREVIEWLAB_PSY_A_CORE FT
Comprehensive Metabolic Panel in AM (08.30.24 @ 05:27)   Sodium: 142 mmol/L  Potassium: 3.9 mmol/L  Chloride: 106: Chloride reference range changed from ..10/26/2022 mmol/L  Carbon Dioxide: 23.0 mmol/L  Anion Gap: 13 mmol/L  Blood Urea Nitrogen: 5.5 mg/dL  Creatinine: 1.08: Icteric. Interpret with caution mg/dL  Glucose: 88 mg/dL  Calcium: 8.9 mg/dL  Protein Total: 5.8 g/dL  Albumin: 3.5 g/dL  Bilirubin Total: 1.6 mg/dL  Alkaline Phosphatase: 50 U/L  Aspartate Aminotransferase (AST/SGOT): 89 U/L  Alanine Aminotransferase (ALT/SGPT): 39 U/L  eGFR: 95: The estimated glomerular filtration rate (eGFR) calculation is based on   the 2021 CKD-EPI creatinine equation, which is validated in male and   female population 18 years of age and older (N Engl J Med 2021;   385:4185-7737). mL/min/[id number] Metabolic Panel in AM (08.30.24 @ 05:27)   Sodium: 142 mmol/L  Potassium: 3.9 mmol/L  Chloride: 106: Chloride reference range changed from ..10/26/2022 mmol/L  Carbon Dioxide: 23.0 mmol/L  Anion Gap: 13 mmol/L  Blood Urea Nitrogen: 5.5 mg/dL  Creatinine: 1.08: Icteric. Interpret with caution mg/dL  Glucose: 88 mg/dL  Calcium: 8.9 mg/dL  Protein Total: 5.8 g/dL  Albumin: 3.5 g/dL  Bilirubin Total: 1.6 mg/dL  Alkaline Phosphatase: 50 U/L  Aspartate Aminotransferase (AST/SGOT): 89 U/L  Alanine Aminotransferase (ALT/SGPT): 39 U/L  eGFR: 95: The estimated glomerular filtration rate (eGFR) calculation is based on   the 2021 CKD-EPI creatinine equation, which is validated in male and   female population 18 years of age and older (N Engl J Med 2021;   385:3746-2080). mL/min/1.73m2
Comprehensive Metabolic Panel in AM (08.30.24 @ 05:27)   Sodium: 142 mmol/L  Potassium: 3.9 mmol/L  Chloride: 106: Chloride reference range changed from ..10/26/2022 mmol/L  Carbon Dioxide: 23.0 mmol/L  Anion Gap: 13 mmol/L  Blood Urea Nitrogen: 5.5 mg/dL  Creatinine: 1.08: Icteric. Interpret with caution mg/dL  Glucose: 88 mg/dL  Calcium: 8.9 mg/dL  Protein Total: 5.8 g/dL  Albumin: 3.5 g/dL  Bilirubin Total: 1.6 mg/dL  Alkaline Phosphatase: 50 U/L  Aspartate Aminotransferase (AST/SGOT): 89 U/L  Alanine Aminotransferase (ALT/SGPT): 39 U/L  eGFR: 95: The estimated glomerular filtration rate (eGFR) calculation is based on   the 2021 CKD-EPI creatinine equation, which is validated in male and   female population 18 years of age and older (N Engl J Med 2021;   385:5197-1516). mL/min/[id number] Metabolic Panel in AM (08.30.24 @ 05:27)   Sodium: 142 mmol/L  Potassium: 3.9 mmol/L  Chloride: 106: Chloride reference range changed from ..10/26/2022 mmol/L  Carbon Dioxide: 23.0 mmol/L  Anion Gap: 13 mmol/L  Blood Urea Nitrogen: 5.5 mg/dL  Creatinine: 1.08: Icteric. Interpret with caution mg/dL  Glucose: 88 mg/dL  Calcium: 8.9 mg/dL  Protein Total: 5.8 g/dL  Albumin: 3.5 g/dL  Bilirubin Total: 1.6 mg/dL  Alkaline Phosphatase: 50 U/L  Aspartate Aminotransferase (AST/SGOT): 89 U/L  Alanine Aminotransferase (ALT/SGPT): 39 U/L  eGFR: 95: The estimated glomerular filtration rate (eGFR) calculation is based on   the 2021 CKD-EPI creatinine equation, which is validated in male and   female population 18 years of age and older (N Engl J Med 2021;   [id number]). mL/min/1.73m2
CBC Full  -  ( 01 Sep 2024 06:17 )  WBC Count : 3.25 K/uL  RBC Count : 4.37 M/uL  Hemoglobin : 13.3 g/dL  Hematocrit : 37.7 %  Platelet Count - Automated : 169 K/uL  Mean Cell Volume : 86.3 fl  Mean Cell Hemoglobin : 30.4 pg  Mean Cell Hemoglobin Concentration : 35.3 gm/dL  Auto Neutrophil # : x  Auto Lymphocyte # : x  Auto Monocyte # : x  Auto Eosinophil # : x  Auto Basophil # : x  Auto Neutrophil % : x  Auto Lymphocyte % : x  Auto Monocyte % : x  Auto Eosinophil % : x  Auto Basophil % : x  
Comprehensive Metabolic Panel in AM (08.30.24 @ 05:27)   Sodium: 142 mmol/L  Potassium: 3.9 mmol/L  Chloride: 106: Chloride reference range changed from ..10/26/2022 mmol/L  Carbon Dioxide: 23.0 mmol/L  Anion Gap: 13 mmol/L  Blood Urea Nitrogen: 5.5 mg/dL  Creatinine: 1.08: Icteric. Interpret with caution mg/dL  Glucose: 88 mg/dL  Calcium: 8.9 mg/dL  Protein Total: 5.8 g/dL  Albumin: 3.5 g/dL  Bilirubin Total: 1.6 mg/dL  Alkaline Phosphatase: 50 U/L  Aspartate Aminotransferase (AST/SGOT): 89 U/L  Alanine Aminotransferase (ALT/SGPT): 39 U/L  eGFR: 95: The estimated glomerular filtration rate (eGFR) calculation is based on   the 2021 CKD-EPI creatinine equation, which is validated in male and   female population 18 years of age and older (N Engl J Med 2021;   385:2380-5576). mL/min/1.73r5Gipszibjspsyx Metabolic Panel in AM (08.30.24 @ 05:27)   Sodium: 142 mmol/L  Potassium: 3.9 mmol/L  Chloride: 106: Chloride reference range changed from ..10/26/2022 mmol/L  Carbon Dioxide: 23.0 mmol/L  Anion Gap: 13 mmol/L  Blood Urea Nitrogen: 5.5 mg/dL  Creatinine: 1.08: Icteric. Interpret with caution mg/dL  Glucose: 88 mg/dL  Calcium: 8.9 mg/dL  Protein Total: 5.8 g/dL  Albumin: 3.5 g/dL  Bilirubin Total: 1.6 mg/dL  Alkaline Phosphatase: 50 U/L  Aspartate Aminotransferase (AST/SGOT): 89 U/L  Alanine Aminotransferase (ALT/SGPT): 39 U/L  eGFR: 95: The estimated glomerular filtration rate (eGFR) calculation is based on   the 2021 CKD-EPI creatinine equation, which is validated in male and   female population 18 years of age and older (N Engl J Med 2021;   385:4999-1276). mL/min/1.73m2

## 2024-09-04 NOTE — PROGRESS NOTE ADULT - SUBJECTIVE AND OBJECTIVE BOX
HPI  Pt is a 30yo M admitted to [facility] for acute psychosis   Pt was seen and examined at bedside. CPK below 500 today. No overnight complaints     Vital Signs Last 24 Hrs  T(C): 36.7 (04 Sep 2024 11:34), Max: 36.8 (03 Sep 2024 18:29)  T(F): 98.1 (04 Sep 2024 11:34), Max: 98.2 (03 Sep 2024 18:29)  HR: 98 (04 Sep 2024 11:34) (89 - 103)  BP: 113/75 (04 Sep 2024 11:34) (106/69 - 117/81)  BP(mean): --  RR: 18 (04 Sep 2024 11:34) (18 - 18)  SpO2: 99% (04 Sep 2024 11:34) (95% - 100%)    Parameters below as of 04 Sep 2024 11:34  Patient On (Oxygen Delivery Method): room air        I&O's Summary    03 Sep 2024 07:01  -  04 Sep 2024 07:00  --------------------------------------------------------  IN: 1674 mL / OUT: 1 mL / NET: 1673 mL        CAPILLARY BLOOD GLUCOSE          PHYSICAL EXAM:    Constitutional: NAD,   HEENT: PERR, EOMI, Normal Hearing, MMM  Neck: Soft and supple,   Respiratory: Breath sounds are clear bilaterally,   Cardiovascular: S1 and S2,    Gastrointestinal: Bowel Sounds present, soft,    Extremities: No peripheral edema  Vascular: 2+ peripheral pulses  Neurological: A/O x 3, no focal deficits  Musculoskeletal: 5/5 strength b/l upper and lower extremities  Skin: No rashes    MEDICATIONS:  MEDICATIONS  (STANDING):  lactated ringers. 1000 milliLiter(s) (100 mL/Hr) IV Continuous <Continuous>  risperiDONE   Tablet 0.5 milliGRAM(s) Oral two times a day      LABS: All Labs Reviewed:            CARDIAC MARKERS ( 04 Sep 2024 04:55 )  x     / x     / x     / x     / 1.1 ng/mL  CARDIAC MARKERS ( 03 Sep 2024 15:27 )  x     / x     / x     / x     / 1.2 ng/mL  CARDIAC MARKERS ( 03 Sep 2024 06:20 )  x     / x     / x     / x     / 1.3 ng/mL      Blood Culture:     RADIOLOGY/EKG:    DVT PPX:    ADVANCED DIRECTIVE:    DISPOSITION:
seen for psychosis    feels well/  no events  ros negative     MEDICATIONS  (STANDING):  lactated ringers. 1000 milliLiter(s) (100 mL/Hr) IV Continuous <Continuous>  risperiDONE   Tablet 0.5 milliGRAM(s) Oral two times a day    MEDICATIONS  (PRN):  acetaminophen     Tablet .. 650 milliGRAM(s) Oral every 6 hours PRN Temp greater or equal to 38C (100.4F), Mild Pain (1 - 3)  aluminum hydroxide/magnesium hydroxide/simethicone Suspension 30 milliLiter(s) Oral every 4 hours PRN Dyspepsia  haloperidol     Tablet 5 milliGRAM(s) Oral every 6 hours PRN For Severe Agitation / Anxiety  LORazepam   Injectable 2 milliGRAM(s) IV Push every 6 hours PRN Agitation  melatonin 3 milliGRAM(s) Oral at bedtime PRN Insomnia  ondansetron Injectable 4 milliGRAM(s) IV Push every 8 hours PRN Nausea and/or Vomiting      Allergies    Allergy Status Unknown         Vital Signs Last 24 Hrs  T(C): 36.5 (02 Sep 2024 08:30), Max: 37 (01 Sep 2024 16:09)  T(F): 97.7 (02 Sep 2024 08:30), Max: 98.6 (01 Sep 2024 16:09)  HR: 72 (02 Sep 2024 08:30) (66 - 82)  BP: 119/82 (02 Sep 2024 08:30) (118/81 - 128/88)  BP(mean): --  RR: 18 (02 Sep 2024 08:30) (18 - 18)  SpO2: 97% (02 Sep 2024 04:41) (97% - 97%)    Parameters below as of 02 Sep 2024 04:41  Patient On (Oxygen Delivery Method): room air        PHYSICAL EXAM:    GENERAL: NAD  EXTREMITIES:  no edema   NERVOUS SYSTEM:  Alert & Oriented X3, ; Motor Strength 5/5 B/L upper and lower extremities  PSYCH: calm/cooperative     LABS:                        13.3   3.25  )-----------( 169      ( 01 Sep 2024 06:17 )             37.7                 CAPILLARY BLOOD GLUCOSE            RADIOLOGY & ADDITIONAL TESTS:  
  seen for psychosis    no acute complaints/events      MEDICATIONS  (STANDING):  lactated ringers. 1000 milliLiter(s) (100 mL/Hr) IV Continuous <Continuous>  risperiDONE   Tablet 0.5 milliGRAM(s) Oral two times a day    MEDICATIONS  (PRN):  acetaminophen     Tablet .. 650 milliGRAM(s) Oral every 6 hours PRN Temp greater or equal to 38C (100.4F), Mild Pain (1 - 3)  aluminum hydroxide/magnesium hydroxide/simethicone Suspension 30 milliLiter(s) Oral every 4 hours PRN Dyspepsia  haloperidol     Tablet 5 milliGRAM(s) Oral every 6 hours PRN For Severe Agitation / Anxiety  LORazepam   Injectable 2 milliGRAM(s) IV Push every 6 hours PRN Agitation  melatonin 3 milliGRAM(s) Oral at bedtime PRN Insomnia  ondansetron Injectable 4 milliGRAM(s) IV Push every 8 hours PRN Nausea and/or Vomiting      Allergies    Allergy Status Unknown       Vital Signs Last 24 Hrs  T(C): 36.7 (03 Sep 2024 10:44), Max: 36.7 (03 Sep 2024 10:44)  T(F): 98 (03 Sep 2024 10:44), Max: 98 (03 Sep 2024 10:44)  HR: 87 (03 Sep 2024 10:44) (87 - 91)  BP: 129/69 (03 Sep 2024 10:44) (112/78 - 129/69)  BP(mean): --  RR: 18 (02 Sep 2024 15:33) (18 - 18)  SpO2: 98% (03 Sep 2024 10:44) (96% - 98%)    Parameters below as of 03 Sep 2024 10:44  Patient On (Oxygen Delivery Method): room air        PHYSICAL EXAM:    GENERAL: NAD  EXTREMITIES:  no edema   NERVOUS SYSTEM:  Alert & Oriented X3, Motor Strength 5/5 B/L upper and lower extremities  PSYCH: calm/ cooperative     LABS:                CAPILLARY BLOOD GLUCOSE            RADIOLOGY & ADDITIONAL TESTS:  
    seen for psychosis    calm/cooperative  mother at bedside  ros negative    MEDICATIONS  (STANDING):  lactated ringers. 1000 milliLiter(s) (100 mL/Hr) IV Continuous <Continuous>  risperiDONE   Tablet 0.5 milliGRAM(s) Oral two times a day    MEDICATIONS  (PRN):  acetaminophen     Tablet .. 650 milliGRAM(s) Oral every 6 hours PRN Temp greater or equal to 38C (100.4F), Mild Pain (1 - 3)  aluminum hydroxide/magnesium hydroxide/simethicone Suspension 30 milliLiter(s) Oral every 4 hours PRN Dyspepsia  haloperidol     Tablet 5 milliGRAM(s) Oral every 6 hours PRN For Severe Agitation / Anxiety  LORazepam   Injectable 2 milliGRAM(s) IV Push every 6 hours PRN Agitation  melatonin 3 milliGRAM(s) Oral at bedtime PRN Insomnia  ondansetron Injectable 4 milliGRAM(s) IV Push every 8 hours PRN Nausea and/or Vomiting      Allergies    Allergy Status Unknown         Vital Signs Last 24 Hrs  T(C): 36.4 (31 Aug 2024 10:15), Max: 36.8 (30 Aug 2024 16:55)  T(F): 97.5 (31 Aug 2024 10:15), Max: 98.2 (30 Aug 2024 16:55)  HR: 75 (31 Aug 2024 10:15) (68 - 92)  BP: 115/71 (31 Aug 2024 10:15) (115/71 - 129/86)  BP(mean): --  RR: 18 (31 Aug 2024 10:15) (17 - 18)  SpO2: 100% (31 Aug 2024 10:15) (99% - 100%)    Parameters below as of 31 Aug 2024 10:15  Patient On (Oxygen Delivery Method): room air        PHYSICAL EXAM:    GENERAL: NAD   CHEST/LUNG: Clear to ausculation bilaterally   HEART: Regular rate and rhythm; S1 S2   ABDOMEN: Soft, Bowel sounds present  EXTREMITIES:  no edema   NERVOUS SYSTEM:  Alert & Oriented X3, Motor Strength 5/5 B/L upper and lower extremities      LABS:                        13.2   3.23  )-----------( 165      ( 30 Aug 2024 05:27 )             37.7     08-31    139  |  104  |  5.7<L>  ----------------------------<  100<H>  4.0   |  28.0  |  0.97    Ca    8.7      31 Aug 2024 05:20  Phos  2.7     08-30  Mg     1.9     08-30    TPro  5.8<L>  /  Alb  3.5  /  TBili  1.6  /  DBili  x   /  AST  89<H>  /  ALT  39  /  AlkPhos  50  08-30      Urinalysis Basic - ( 31 Aug 2024 05:20 )    Color: x / Appearance: x / SG: x / pH: x  Gluc: 100 mg/dL / Ketone: x  / Bili: x / Urobili: x   Blood: x / Protein: x / Nitrite: x   Leuk Esterase: x / RBC: x / WBC x   Sq Epi: x / Non Sq Epi: x / Bacteria: x        CAPILLARY BLOOD GLUCOSE            RADIOLOGY & ADDITIONAL TESTS:  
  seen for rhabdo/psychosis    feels well  ros negative     MEDICATIONS  (STANDING):  lactated ringers. 1000 milliLiter(s) (100 mL/Hr) IV Continuous <Continuous>  risperiDONE   Tablet 0.5 milliGRAM(s) Oral two times a day    MEDICATIONS  (PRN):  acetaminophen     Tablet .. 650 milliGRAM(s) Oral every 6 hours PRN Temp greater or equal to 38C (100.4F), Mild Pain (1 - 3)  aluminum hydroxide/magnesium hydroxide/simethicone Suspension 30 milliLiter(s) Oral every 4 hours PRN Dyspepsia  haloperidol     Tablet 5 milliGRAM(s) Oral every 6 hours PRN For Severe Agitation / Anxiety  LORazepam   Injectable 2 milliGRAM(s) IV Push every 6 hours PRN Agitation  melatonin 3 milliGRAM(s) Oral at bedtime PRN Insomnia  ondansetron Injectable 4 milliGRAM(s) IV Push every 8 hours PRN Nausea and/or Vomiting      Allergies    Allergy Status Unknown             Vital Signs Last 24 Hrs  T(C): 36.3 (01 Sep 2024 10:40), Max: 37.1 (31 Aug 2024 16:42)  T(F): 97.3 (01 Sep 2024 10:40), Max: 98.7 (31 Aug 2024 16:42)  HR: 81 (01 Sep 2024 10:40) (76 - 81)  BP: 126/74 (01 Sep 2024 10:40) (110/78 - 126/74)  BP(mean): --  RR: 18 (01 Sep 2024 10:40) (18 - 18)  SpO2: 97% (01 Sep 2024 10:40) (97% - 98%)    Parameters below as of 01 Sep 2024 07:41  Patient On (Oxygen Delivery Method): room air        PHYSICAL EXAM:    GENERAL: NAD  CHEST/LUNG: Clear to ausculation bilaterall  HEART: Regular rate and rhythm; S1 S2  ABDOMEN: Soft, Bowel sounds present  EXTREMITIES:  no edema   NERVOUS SYSTEM:  Alert & Oriented X3, Motor Strength 5/5 B/L upper and lower extremities      LABS:                        13.3   3.25  )-----------( 169      ( 01 Sep 2024 06:17 )             37.7     08-31    139  |  104  |  5.7<L>  ----------------------------<  100<H>  4.0   |  28.0  |  0.97    Ca    8.7      31 Aug 2024 05:20        Urinalysis Basic - ( 31 Aug 2024 05:20 )    Color: x / Appearance: x / SG: x / pH: x  Gluc: 100 mg/dL / Ketone: x  / Bili: x / Urobili: x   Blood: x / Protein: x / Nitrite: x   Leuk Esterase: x / RBC: x / WBC x   Sq Epi: x / Non Sq Epi: x / Bacteria: x        CAPILLARY BLOOD GLUCOSE            RADIOLOGY & ADDITIONAL TESTS:  
[facility] Division of Hospital Medicine    Chief Complaint:      SUBJECTIVE / OVERNIGHT EVENTS:    Patient seen and examined at bedside, no acute events overnight, patient denies any new complaints. Discussed with  team, patient will need CPK < 500 prior to inpatient psych admission. Will continue hydration. Started risperdal .5 mg BID, with haldol 5 mg PRN as recommended.     MEDICATIONS  (STANDING):  lactated ringers. 1000 milliLiter(s) (150 mL/Hr) IV Continuous <Continuous>  risperiDONE   Tablet 0.5 milliGRAM(s) Oral two times a day    MEDICATIONS  (PRN):  acetaminophen     Tablet .. 650 milliGRAM(s) Oral every 6 hours PRN Temp greater or equal to 38C (100.4F), Mild Pain (1 - 3)  aluminum hydroxide/magnesium hydroxide/simethicone Suspension 30 milliLiter(s) Oral every 4 hours PRN Dyspepsia  haloperidol     Tablet 5 milliGRAM(s) Oral every 6 hours PRN For Severe Agitation / Anxiety  LORazepam   Injectable 2 milliGRAM(s) IV Push every 6 hours PRN Agitation  melatonin 3 milliGRAM(s) Oral at bedtime PRN Insomnia  ondansetron Injectable 4 milliGRAM(s) IV Push every 8 hours PRN Nausea and/or Vomiting        I&O's Summary    29 Aug 2024 07:01  -  30 Aug 2024 07:00  --------------------------------------------------------  IN: 0 mL / OUT: 3 mL / NET: -3 mL    30 Aug 2024 07:01  -  30 Aug 2024 19:13  --------------------------------------------------------  IN: 240 mL / OUT: 0 mL / NET: 240 mL        PHYSICAL EXAM:  Vital Signs Last 24 Hrs  T(C): 36.8 (30 Aug 2024 16:55), Max: 36.8 (30 Aug 2024 16:55)  T(F): 98.2 (30 Aug 2024 16:55), Max: 98.2 (30 Aug 2024 16:55)  HR: 68 (30 Aug 2024 16:55) (68 - 92)  BP: 129/86 (30 Aug 2024 16:55) (122/76 - 133/81)  BP(mean): --  RR: 18 (30 Aug 2024 16:55) (17 - 18)  SpO2: 100% (30 Aug 2024 16:55) (99% - 100%)    Parameters below as of 30 Aug 2024 16:55  Patient On (Oxygen Delivery Method): room air      General: Age-appearing, in no acute distress  Head: Normocephalic, atraumatic  ENMT: EOMI, neck supple  Cardiovascular: +S1, S2; Regular rate and rhythm, no murmurs, rubs, gallops  Respiratory: CTA BL, no wheezes, rales, rhonchi  Gastrointestinal: Abdomen soft, non-tender, +BS in all 4 quadrants  Extremities: No clubbing, cyanosis, or edema  Musculoskeletal: Normal tone, no deformities  Psych: pleasant, calm, cooperative     LABS:                        13.2   3.23  )-----------( 165      ( 30 Aug 2024 05:27 )             37.7     08-30    142  |  106  |  5.5<L>  ----------------------------<  88  3.9   |  23.0  |  1.08    Ca    8.9      30 Aug 2024 05:27  Phos  2.7     08-30  Mg     1.9     08-30    TPro  5.8<L>  /  Alb  3.5  /  TBili  1.6  /  DBili  x   /  AST  89<H>  /  ALT  39  /  AlkPhos  50  08-30      CARDIAC MARKERS ( 30 Aug 2024 05:27 )  x     / x     / x     / x     / 8.9 ng/mL  CARDIAC MARKERS ( 29 Aug 2024 03:35 )  x     / x     / x     / x     / 26.8 ng/mL      Urinalysis Basic - ( 30 Aug 2024 05:27 )    Color: x / Appearance: x / SG: x / pH: x  Gluc: 88 mg/dL / Ketone: x  / Bili: x / Urobili: x   Blood: x / Protein: x / Nitrite: x   Leuk Esterase: x / RBC: x / WBC x   Sq Epi: x / Non Sq Epi: x / Bacteria: x        Culture - CSF with Gram Stain (collected 29 Aug 2024 00:29)  Source: CSF CSF  Gram Stain (29 Aug 2024 04:10):    No polymorphonuclear cells seen    No organisms seen    by cytocentrifuge  Preliminary Report (30 Aug 2024 07:39):    No growth to date    Culture - Blood (collected 28 Aug 2024 19:00)  Source: .Blood Blood-Peripheral /anareobic plus received  Preliminary Report (30 Aug 2024 04:01):    No growth at 24 hours    Culture - Blood (collected 28 Aug 2024 18:47)  Source: .Blood Blood-Venous / aerobic plus received  Preliminary Report (30 Aug 2024 04:01):    No growth at 24 hours    Culture - Urine (collected 28 Aug 2024 16:00)  Source: Clean Catch Clean Catch (Midstream)  Final Report (29 Aug 2024 22:06):    <10,000 CFU/mL Normal Urogenital Lore      CAPILLARY BLOOD GLUCOSE            RADIOLOGY & ADDITIONAL TESTS:  Results Reviewed:   Imaging Personally Reviewed:  Electrocardiogram Personally Reviewed:        
[facility] Division of Hospital Medicine  Anders Omalley MD, TAMMI  I'm reachable on [employer] Teams    Patient is a 29y old  Male who presents with a chief complaint of Encephalopathy, r/o Meningitis (28 Aug 2024 23:28)      SUBJECTIVE / OVERNIGHT EVENTS:  No events overnight. The patient is in no acute distress. Not talking, but nodded his head when I asked if he was thirsty.     MEDICATIONS  (STANDING):  lactated ringers. 1000 milliLiter(s) (150 mL/Hr) IV Continuous <Continuous>    MEDICATIONS  (PRN):  acetaminophen     Tablet .. 650 milliGRAM(s) Oral every 6 hours PRN Temp greater or equal to 38C (100.4F), Mild Pain (1 - 3)  aluminum hydroxide/magnesium hydroxide/simethicone Suspension 30 milliLiter(s) Oral every 4 hours PRN Dyspepsia  LORazepam   Injectable 2 milliGRAM(s) IV Push every 6 hours PRN Agitation  melatonin 3 milliGRAM(s) Oral at bedtime PRN Insomnia  ondansetron Injectable 4 milliGRAM(s) IV Push every 8 hours PRN Nausea and/or Vomiting    CAPILLARY BLOOD GLUCOSE        I&O's Summary    29 Aug 2024 07:01  -  29 Aug 2024 12:57  --------------------------------------------------------  IN: 0 mL / OUT: 2 mL / NET: -2 mL        PHYSICAL EXAM:  Vital Signs Last 24 Hrs  T(C): 36.4 (29 Aug 2024 10:00), Max: 38.1 (28 Aug 2024 15:15)  T(F): 97.6 (29 Aug 2024 10:00), Max: 100.6 (28 Aug 2024 15:15)  HR: 94 (29 Aug 2024 10:00) (76 - 130)  BP: 135/72 (29 Aug 2024 10:00) (111/70 - 150/98)  BP(mean): 81 (28 Aug 2024 19:17) (81 - 108)  RR: 18 (29 Aug 2024 10:00) (12 - 18)  SpO2: 100% (29 Aug 2024 10:00) (99% - 100%)    Parameters below as of 29 Aug 2024 10:00  Patient On (Oxygen Delivery Method): room air    General: Age-appearing, in no acute distress  Head: Normocephalic, atraumatic  ENMT: EOMI, neck supple  Cardiovascular: +S1, S2; Regular rate and rhythm, no murmurs, rubs, gallops  Respiratory: CTA BL, no wheezes, rales, rhonchi  Gastrointestinal: Abdomen soft, non-tender, +BS in all 4 quadrants  Extremities: No clubbing, cyanosis, or edema  Musculoskeletal: Normal tone, no deformities  Psych: not cooperative, not talking       LABS:                        14.4   7.06  )-----------( 188      ( 29 Aug 2024 03:35 )             41.6     08-29    142  |  107  |  9.6  ----------------------------<  97  3.8   |  16.0<L>  |  0.92    Ca    8.3<L>      29 Aug 2024 03:35    TPro  7.2  /  Alb  4.5  /  TBili  2.8<H>  /  DBili  x   /  AST  65<H>  /  ALT  34  /  AlkPhos  59  08-28      CARDIAC MARKERS ( 29 Aug 2024 03:35 )  x     / x     / x     / x     / 26.8 ng/mL  CARDIAC MARKERS ( 28 Aug 2024 15:27 )  x     / x     / x     / x     / 11.3 ng/mL      Urinalysis Basic - ( 29 Aug 2024 03:35 )    Color: x / Appearance: x / SG: x / pH: x  Gluc: 97 mg/dL / Ketone: x  / Bili: x / Urobili: x   Blood: x / Protein: x / Nitrite: x   Leuk Esterase: x / RBC: x / WBC x   Sq Epi: x / Non Sq Epi: x / Bacteria: x        Culture - CSF with Gram Stain (collected 29 Aug 2024 00:29)  Source: CSF CSF  Gram Stain (29 Aug 2024 04:10):    No polymorphonuclear cells seen    No organisms seen    by cytocentrifuge

## 2024-09-04 NOTE — DISCHARGE NOTE PROVIDER - NSDCMRMEDTOKEN_GEN_ALL_CORE_FT
haloperidol 5 mg oral tablet: 1 tab(s) orally every 6 hours As needed For Severe Agitation / Anxiety  LORazepam 1 mg/mL-NaCl 0.9% intravenous solution: 2 milliliter(s) intravenous every 6 hours As needed Agitation  risperiDONE 0.5 mg oral tablet: 1 tab(s) orally 2 times a day

## 2024-09-04 NOTE — DISCHARGE NOTE PROVIDER - PROVIDER TOKENS
FREE:[LAST:[PCP],PHONE:[(   )    -],FAX:[(   )    -],FOLLOWUP:[1 week]],FREE:[LAST:[Psychiatry],PHONE:[(   )    -],FAX:[(   )    -],FOLLOWUP:[1 week]]
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
## 2024-09-04 NOTE — PROGRESS NOTE ADULT - ASSESSMENT
28 yo male with no reported pmhx presented to the ED by police after being found in a dumpster acting erratically. Admitted for Acute Psychosis with plan for inpatient Psych admission, medicine admitted due to Elevated CPK with plan for transfer to psych admission      #Acute Psychosis   -SIRS+ on admission with rectal temp 100.6 and tachycardia   -Tox screen showing THC and Benzos (benzos were given in ED)  -CXR neg, UA neg, LP negative for nucleated cells, organisms; CSF protein and glucose normal  -antibiotics not indicated at this time  -blood cultures, CSF, urine without signs of infection, no suspicion for acute CNS infection at this time / no need for encephalitis panel    - following recs appreciated started on Risperdal .5 mg BID with haldol PO PRN,  Plan for inpatient psych admission once CPK improves to <500    #Elevated CK, rhabdomyolysis  - CK peaked at 4709, c/w IVF  - As per , goal CPK < 500 for inpatient psych admission     DVTppx: SCDs  Dispo: Inpatient psych transfer once CPK < 500, expect 2-3 further days     d/w mother at bedside
28 yo male with no reported pmhx presented to the ED by police after being found in a dumpster acting erratically. Admitted for Acute Psychosis with plan for inpatient Psych admission, medicine admitted due to Elevated CPK with plan for transfer to psych admission      #Acute Psychosis   #mild rhabdo  -SIRS+ on admission with rectal temp 100.6 and tachycardia   -Tox screen showing THC and Benzos (benzos were given in ED)  -CXR neg, UA neg, LP negative for nucleated cells, organisms; CSF protein and glucose normal  -antibiotics not indicated at this time  -blood cultures, CSF, urine without signs of infection, no suspicion for acute CNS infection at this time / no need for encephalitis panel    -BH following recs appreciated started on Risperdal .5 mg BID with haldol PO PRN,      DVTppx: SCDs  Dispo: Inpatient psych transfer once CPK < 500     d/w patient at bedside
28 yo male with no reported pmhx presented to the ED by police after being found in a dumpster acting erratically. Admitted for Acute Psychosis with plan for inpatient Psych admission, medicine admitted due to Elevated CPK with plan for transfer to psych admission      #Acute Psychosis   #mild rhabdo  -SIRS+ on admission with rectal temp 100.6 and tachycardia   -Tox screen showing THC and Benzos (benzos were given in ED)  -CXR neg, UA neg, LP negative for nucleated cells, organisms; CSF protein and glucose normal  -antibiotics not indicated at this time  -blood cultures, CSF, urine without signs of infection, no suspicion for acute CNS infection at this time / no need for encephalitis panel    -BH following recs appreciated started on Risperdal .5 mg BID with haldol PO PRN,      DVTppx: SCDs  Dispo: Inpatient psych transfer once CPK < 500, expect 2-3 further days     d/w patient at bedside
28 yo male with no reported pmhx presented to the ED by police after being found in a dumpster acting erratically.    # Acute Encephalopathy, strongly suspect underlying Psychiatric illness  -SIRS+ on admission with rectal temp 100.6 and tachycardia   -Tox screen showing THC and Benzos (benzos were given in ED)  -CXR neg, UA neg, LP negative for nucleated cells, organisms; CSF protein and glucose normal  -antibiotics not indicated at this time  -Follow up blood cultures   -BH following    # Elevated CK, rabdomyolysis  - CK uptrending from 1829 on admission to 4709 today  - patient is not eating/ drinking much  - start LR at 150 cc/hr  - repeat CK in AM    DVTppx: SCDs  Dispo: R/o organic causes, then likely dispo to inpatient psych if negative      
28 yo male with no reported pmhx presented to the ED by police after being found in a dumpster acting erratically. Admitted for Acute Psychosis with plan for inpatient Psych admission, medicine admitted due to Elevated CPK with plan for transfer to psych admission      #Acute Psychosis   #mild rhabdo  -SIRS+ on admission with rectal temp 100.6 and tachycardia   -Tox screen showing THC and Benzos (benzos were given in ED)  -CXR neg, UA neg, LP negative for nucleated cells, organisms; CSF protein and glucose normal  -antibiotics not indicated at this time  -blood cultures, CSF, urine without signs of infection, no suspicion for acute CNS infection at this time / no need for encephalitis panel    -BH following recs appreciated started on Risperdal .5 mg BID with haldol PO PRN,      DVTppx: SCDs  Dispo: pending psych clearance for Inpatient psych transfer.  if inpatient psych then requires CPK < 500     d/w patient at bedside
28 yo male with no reported pmhx presented to the ED by police after being found in a dumpster acting erratically. Admitted for Acute Psychosis with plan for inpatient Psych admission, medicine admitted due to Elevated CPK with plan for transfer to psych admission      *Acute Psychosis   Pt is medically stable for inpt psy   psy following   risperdal 0.5mg BID   Haldol PRN     *mild rhabdo  SIRS+ on admission with rectal temp 100.6 and tachycardia   Tox screen showing THC and Benzos (benzos were given in ED)  CXR neg, UA neg, LP negative for nucleated cells, organisms; CSF protein and glucose normal  antibiotics not indicated at this time  blood cultures, CSF, urine without signs of infection, no suspicion for acute CNS infection at this time / no need for encephalitis panel     today       DVTppx: SCDs  Dispo: pending psych clearance for Inpatient psych transfer, pt is medically stable for DC     
28 yo male with no reported pmhx presented to the ED by police after being found in a dumpster acting erratically. Admitted for Acute Psychosis with plan for inpatient Psych admission, medicine admitted due to Elevated CPK with plan for transfer to psych admission      #Acute Encephalopathy  #Acute Psychosis   -SIRS+ on admission with rectal temp 100.6 and tachycardia   -Tox screen showing THC and Benzos (benzos were given in ED)  -CXR neg, UA neg, LP negative for nucleated cells, organisms; CSF protein and glucose normal  -antibiotics not indicated at this time  -blood cultures, CSF, urine without signs of infection, no suspicion for acute CNS infection at this time / no need for encephalitis panel    - following recs appreciated started on Risperdal .5 mg BID with haldol PO PRN, and Ativan IM PRN   Plan for inpatient psych admission once CPK improves     #Elevated CK, rabdomyolysis  - CK peaked at 4709, improved to 3501 with IVF, will continue IVF hydration   - As per , goal CPK < 500 for inpatient psych admission   - patient is not eating/ drinking much  - c/w LR at 150 cc/hr  - repeat CK in AM    DVTppx: SCDs  Dispo: Inpatient psych transfer once CPK < 500, expect 2-3 further days

## 2024-09-04 NOTE — PROGRESS NOTE ADULT - REASON FOR ADMISSION
Encephalopathy, r/o Meningitis

## 2024-09-04 NOTE — DISCHARGE NOTE PROVIDER - NSDCCPCAREPLAN_GEN_ALL_CORE_FT
PRINCIPAL DISCHARGE DIAGNOSIS  Diagnosis: Acute psychosis  Assessment and Plan of Treatment: - Continue medications as directed  - Follow up with Psychiatry and PCP in 1 week      SECONDARY DISCHARGE DIAGNOSES  Diagnosis: Rhabdomyolysis  Assessment and Plan of Treatment: - Improved  - Follow up with PCP in 1 week     PRINCIPAL DISCHARGE DIAGNOSIS  Diagnosis: Acute psychosis  Assessment and Plan of Treatment: in doyle zarate on discharge      SECONDARY DISCHARGE DIAGNOSES  Diagnosis: Rhabdomyolysis  Assessment and Plan of Treatment: - Improved  - Follow up with PCP in 1 week

## 2024-09-04 NOTE — DISCHARGE NOTE PROVIDER - HOSPITAL COURSE
30 yo male with no reported pmhx presented to the ED by police after being found in a dumpster acting erratically. Pt admitted for acute psychosis with plan for inpatient Psych admission. Pt found to be SIRS+ on admission - work up negative. No abx indicated at this time. Pt also had mild rhabo, CPK elevated. Pt has since been optimized overall and is recommended to follow up with psychiatry outpatient in 1 week.     All electrolyte abnormalities were monitored carefully and repleted as necessary during this hospitalization. At the time of discharge patient was hemodynamically stable and amenable to all terms of discharge.      28 yo male with no reported pmhx presented to the ED by police after being found in a dumpster acting erratically. Pt admitted for acute psychosis with plan for inpatient Psych admission. Pt found to be SIRS+ on admission - work up negative. No abx indicated at this time. Pt also had mild rhabo, CPK elevated. Pt has since been optimized overall and is recommended to follow up with psychiatry outpatient in 1 week.     All electrolyte abnormalities were monitored carefully and repleted as necessary during this hospitalization. At the time of discharge patient was hemodynamically stable and amenable to all terms of discharge.     Vital Signs Last 24 Hrs  T(C): 36.6 (05 Sep 2024 10:13), Max: 36.7 (04 Sep 2024 17:27)  T(F): 97.8 (05 Sep 2024 10:13), Max: 98 (04 Sep 2024 17:27)  HR: 91 (05 Sep 2024 10:13) (75 - 91)  BP: 110/68 (05 Sep 2024 10:13) (110/68 - 121/78)  BP(mean): --  RR: 18 (05 Sep 2024 10:13) (18 - 18)  SpO2: 97% (05 Sep 2024 10:13) (97% - 99%)    Parameters below as of 05 Sep 2024 10:13  Patient On (Oxygen Delivery Method): room air    PHYSICAL EXAM:    GENERAL: NAD, well-groomed, well-developed  HEAD:  NC/AT  EYES: EOMI, PERRLA, no scleral icterus  HEENT: Moist mucous membranes  NECK: Supple, No JVD  CNS:  Alert & Oriented X3,   LUNG: Clear to auscultation bilaterally; No rales, rhonchi, wheezing, or rubs  HEART: RRR; No murmurs, rubs, or gallops  ABDOMEN: +BS, ST/ND/NT  GENITOURINARY- Voiding, Bladder not distended  EXTREMITIES:  2+ Peripheral Pulses, No clubbing, cyanosis, or edema     30 yo male with no reported pmhx presented to the ED by police after being found in a dumpster acting erratically. Pt admitted for acute psychosis with plan for inpatient Psych admission. Pt found to be SIRS+ on admission - work up negative. No abx indicated at this time. Pt also had mild rhabo, CPK elevated. Patient will be transferred to in patient psych for further stablization     All electrolyte abnormalities were monitored carefully and repleted as necessary during this hospitalization. At the time of discharge patient was hemodynamically stable and amenable to all terms of discharge.     Vital Signs Last 24 Hrs  T(C): 36.6 (05 Sep 2024 10:13), Max: 36.7 (04 Sep 2024 17:27)  T(F): 97.8 (05 Sep 2024 10:13), Max: 98 (04 Sep 2024 17:27)  HR: 91 (05 Sep 2024 10:13) (75 - 91)  BP: 110/68 (05 Sep 2024 10:13) (110/68 - 121/78)  BP(mean): --  RR: 18 (05 Sep 2024 10:13) (18 - 18)  SpO2: 97% (05 Sep 2024 10:13) (97% - 99%)    Parameters below as of 05 Sep 2024 10:13  Patient On (Oxygen Delivery Method): room air    PHYSICAL EXAM:    GENERAL: NAD, well-groomed, well-developed  HEAD:  NC/AT  EYES: EOMI, PERRLA, no scleral icterus  HEENT: Moist mucous membranes  NECK: Supple, No JVD  CNS:  Alert & Oriented X3,   LUNG: Clear to auscultation bilaterally; No rales, rhonchi, wheezing, or rubs  HEART: RRR; No murmurs, rubs, or gallops  ABDOMEN: +BS, ST/ND/NT  GENITOURINARY- Voiding, Bladder not distended  EXTREMITIES:  2+ Peripheral Pulses, No clubbing, cyanosis, or edema

## 2024-09-05 ENCOUNTER — TRANSCRIPTION ENCOUNTER (OUTPATIENT)
Age: 29
End: 2024-09-05

## 2024-09-05 VITALS
RESPIRATION RATE: 18 BRPM | SYSTOLIC BLOOD PRESSURE: 110 MMHG | OXYGEN SATURATION: 97 % | TEMPERATURE: 98 F | HEART RATE: 91 BPM | DIASTOLIC BLOOD PRESSURE: 68 MMHG

## 2024-09-05 PROCEDURE — 86703 HIV-1/HIV-2 1 RESULT ANTBDY: CPT

## 2024-09-05 PROCEDURE — 99239 HOSP IP/OBS DSCHRG MGMT >30: CPT

## 2024-09-05 PROCEDURE — 82550 ASSAY OF CK (CPK): CPT

## 2024-09-05 PROCEDURE — 87205 SMEAR GRAM STAIN: CPT

## 2024-09-05 PROCEDURE — 99232 SBSQ HOSP IP/OBS MODERATE 35: CPT

## 2024-09-05 PROCEDURE — 84100 ASSAY OF PHOSPHORUS: CPT

## 2024-09-05 PROCEDURE — 96372 THER/PROPH/DIAG INJ SC/IM: CPT

## 2024-09-05 PROCEDURE — 86789 WEST NILE VIRUS ANTIBODY: CPT

## 2024-09-05 PROCEDURE — 87015 SPECIMEN INFECT AGNT CONCNTJ: CPT

## 2024-09-05 PROCEDURE — 99285 EMERGENCY DEPT VISIT HI MDM: CPT | Mod: 25

## 2024-09-05 PROCEDURE — 82945 GLUCOSE OTHER FLUID: CPT

## 2024-09-05 PROCEDURE — 80307 DRUG TEST PRSMV CHEM ANLYZR: CPT

## 2024-09-05 PROCEDURE — 86788 WEST NILE VIRUS AB IGM: CPT

## 2024-09-05 PROCEDURE — 93005 ELECTROCARDIOGRAM TRACING: CPT

## 2024-09-05 PROCEDURE — 36415 COLL VENOUS BLD VENIPUNCTURE: CPT

## 2024-09-05 PROCEDURE — 87040 BLOOD CULTURE FOR BACTERIA: CPT

## 2024-09-05 PROCEDURE — 80053 COMPREHEN METABOLIC PANEL: CPT

## 2024-09-05 PROCEDURE — 70450 CT HEAD/BRAIN W/O DYE: CPT | Mod: MC

## 2024-09-05 PROCEDURE — 87637 SARSCOV2&INF A&B&RSV AMP PRB: CPT

## 2024-09-05 PROCEDURE — 85027 COMPLETE CBC AUTOMATED: CPT

## 2024-09-05 PROCEDURE — 83605 ASSAY OF LACTIC ACID: CPT

## 2024-09-05 PROCEDURE — 80048 BASIC METABOLIC PNL TOTAL CA: CPT

## 2024-09-05 PROCEDURE — 84157 ASSAY OF PROTEIN OTHER: CPT

## 2024-09-05 PROCEDURE — 82553 CREATINE MB FRACTION: CPT

## 2024-09-05 PROCEDURE — 89051 BODY FLUID CELL COUNT: CPT

## 2024-09-05 PROCEDURE — 87483 CNS DNA AMP PROBE TYPE 12-25: CPT

## 2024-09-05 PROCEDURE — 87635 SARS-COV-2 COVID-19 AMP PRB: CPT

## 2024-09-05 PROCEDURE — 86617 LYME DISEASE ANTIBODY: CPT

## 2024-09-05 PROCEDURE — 83735 ASSAY OF MAGNESIUM: CPT

## 2024-09-05 PROCEDURE — 83615 LACTATE (LD) (LDH) ENZYME: CPT

## 2024-09-05 PROCEDURE — 85025 COMPLETE CBC W/AUTO DIFF WBC: CPT

## 2024-09-05 PROCEDURE — 87086 URINE CULTURE/COLONY COUNT: CPT

## 2024-09-05 PROCEDURE — 81001 URINALYSIS AUTO W/SCOPE: CPT

## 2024-09-05 PROCEDURE — 71046 X-RAY EXAM CHEST 2 VIEWS: CPT

## 2024-09-05 PROCEDURE — 96374 THER/PROPH/DIAG INJ IV PUSH: CPT

## 2024-09-05 PROCEDURE — 87070 CULTURE OTHR SPECIMN AEROBIC: CPT

## 2024-09-05 RX ORDER — LORAZEPAM 4 MG/ML
2 INJECTION INTRAMUSCULAR; INTRAVENOUS
Qty: 0 | Refills: 0 | DISCHARGE
Start: 2024-09-05

## 2024-09-05 RX ORDER — HALOPERIDOL 1 MG
1 TABLET ORAL
Qty: 0 | Refills: 0 | DISCHARGE
Start: 2024-09-05

## 2024-09-05 RX ORDER — RISPERIDONE 0.25 MG/1
1 TABLET, FILM COATED ORAL
Qty: 0 | Refills: 0 | DISCHARGE
Start: 2024-09-05

## 2024-09-05 RX ADMIN — Medication 100 MILLILITER(S): at 04:45

## 2024-09-05 RX ADMIN — RISPERIDONE 0.5 MILLIGRAM(S): 0.25 TABLET, FILM COATED ORAL at 06:29

## 2024-09-05 RX ADMIN — Medication 100 MILLILITER(S): at 10:03
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
## 2024-09-05 NOTE — BH CONSULTATION LIAISON PROGRESS NOTE - NSBHCHARTREVIEWINVESTIGATE_PSY_A_CORE FT
Basic Metabolic Panel in AM (08.31.24 @ 05:20)   Sodium: 139 mmol/L  Potassium: 4.0 mmol/L  Chloride: 104: Chloride reference range changed from ..10/26/2022   . mmol/L  Carbon Dioxide: 28.0 mmol/L  Anion Gap: 7 mmol/L  Blood Urea Nitrogen: 5.7 mg/dL  Creatinine: 0.97: Icteric. Interpret with caution mg/dL  Glucose: 100 mg/dL  Calcium: 8.7 mg/dL  eGFR: 108: The estimated glomerular filtration rate (eGFR) calculation is based on   the 2021 CKD-EPI creatinine equation, which is validated in male and   female population 18 years of age and older (N Engl J Med 2021;   385:1988-7971). mL/min/1.73m2    Comprehensive Metabolic Panel in AM (08.30.24 @ 05:27)   Sodium: 142 mmol/L  Potassium: 3.9 mmol/L  Chloride: 106: Chloride reference range changed from ..10/26/2022 mmol/L  Carbon Dioxide: 23.0 mmol/L  Anion Gap: 13 mmol/L  Blood Urea Nitrogen: 5.5 mg/dL  Creatinine: 1.08: Icteric. Interpret with caution mg/dL  Glucose: 88 mg/dL  Calcium: 8.9 mg/dL  Protein Total: 5.8 g/dL  Albumin: 3.5 g/dL  Bilirubin Total: 1.6 mg/dL  Alkaline Phosphatase: 50 U/L  Aspartate Aminotransferase (AST/SGOT): 89 U/L  Alanine Aminotransferase (ALT/SGPT): 39 U/L  eGFR: 95: The estimated glomerular filtration rate (eGFR) calculation is based on   the 2021 CKD-EPI creatinine equation, which is validated in male and   female population 18 years of age and older (N Engl J Med 2021;   385:3382-9573). mL/min/1.73m2

## 2024-09-05 NOTE — DISCHARGE NOTE NURSING/CASE MANAGEMENT/SOCIAL WORK - PATIENT PORTAL LINK FT
You can access the FollowMyHealth Patient Portal offered by [facility] by registering at the following website: [email or web address]. By joining [employer]’s FollowMyHealth portal, you will also be able to view your health information using other applications (apps) compatible with our system.
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
## 2024-09-05 NOTE — DISCHARGE NOTE NURSING/CASE MANAGEMENT/SOCIAL WORK - NSDCPEFALRISK_GEN_ALL_CORE
For information on Fall & Injury Prevention, visit: [email or web address] OR  [email or web address] OR  https://www.cdc.gov/steadi/patient.html

## 2024-09-05 NOTE — BH CONSULTATION LIAISON PROGRESS NOTE - CURRENT MEDICATION
MEDICATIONS  (STANDING):  lactated ringers. 1000 milliLiter(s) (150 mL/Hr) IV Continuous <Continuous>    MEDICATIONS  (PRN):  acetaminophen     Tablet .. 650 milliGRAM(s) Oral every 6 hours PRN Temp greater or equal to 38C (100.4F), Mild Pain (1 - 3)  aluminum hydroxide/magnesium hydroxide/simethicone Suspension 30 milliLiter(s) Oral every 4 hours PRN Dyspepsia  LORazepam   Injectable 2 milliGRAM(s) IV Push every 6 hours PRN Agitation  melatonin 3 milliGRAM(s) Oral at bedtime PRN Insomnia  ondansetron Injectable 4 milliGRAM(s) IV Push every 8 hours PRN Nausea and/or Vomiting  
MEDICATIONS  (STANDING):  lactated ringers. 1000 milliLiter(s) (100 mL/Hr) IV Continuous <Continuous>  risperiDONE   Tablet 0.5 milliGRAM(s) Oral two times a day    MEDICATIONS  (PRN):  acetaminophen     Tablet .. 650 milliGRAM(s) Oral every 6 hours PRN Temp greater or equal to 38C (100.4F), Mild Pain (1 - 3)  aluminum hydroxide/magnesium hydroxide/simethicone Suspension 30 milliLiter(s) Oral every 4 hours PRN Dyspepsia  haloperidol     Tablet 5 milliGRAM(s) Oral every 6 hours PRN For Severe Agitation / Anxiety  LORazepam   Injectable 2 milliGRAM(s) IV Push every 6 hours PRN Agitation  melatonin 3 milliGRAM(s) Oral at bedtime PRN Insomnia  ondansetron Injectable 4 milliGRAM(s) IV Push every 8 hours PRN Nausea and/or Vomiting  

## 2024-09-05 NOTE — BH CONSULTATION LIAISON PROGRESS NOTE - NSBHATTESTTYPEVISIT_PSY_A_CORE
On-site Attending supervising TERRANCE (99XXX codes)

## 2024-09-05 NOTE — BH CONSULTATION LIAISON PROGRESS NOTE - NSBHCHARTREVIEWVS_PSY_A_CORE FT
Vital Signs Last 24 Hrs  T(C): 36.6 (05 Sep 2024 10:13), Max: 36.7 (04 Sep 2024 17:27)  T(F): 97.8 (05 Sep 2024 10:13), Max: 98 (04 Sep 2024 17:27)  HR: 91 (05 Sep 2024 10:13) (75 - 91)  BP: 110/68 (05 Sep 2024 10:13) (110/68 - 121/78)  BP(mean): --  RR: 18 (05 Sep 2024 10:13) (18 - 18)  SpO2: 97% (05 Sep 2024 10:13) (97% - 99%)    Parameters below as of 05 Sep 2024 10:13  Patient On (Oxygen Delivery Method): room air    
Vital Signs Last 24 Hrs  T(C): 36.7 (30 Aug 2024 12:00), Max: 37 (29 Aug 2024 15:54)  T(F): 98 (30 Aug 2024 12:00), Max: 98.6 (29 Aug 2024 15:54)  HR: 77 (30 Aug 2024 12:00) (77 - 91)  BP: 133/81 (30 Aug 2024 12:00) (122/76 - 134/70)  BP(mean): --  RR: 18 (30 Aug 2024 12:00) (18 - 18)  SpO2: 99% (30 Aug 2024 12:00) (99% - 100%)    Parameters below as of 30 Aug 2024 12:00  Patient On (Oxygen Delivery Method): room air    
Vital Signs Last 24 Hrs  T(C): 36.7 (04 Sep 2024 11:34), Max: 36.8 (03 Sep 2024 18:29)  T(F): 98.1 (04 Sep 2024 11:34), Max: 98.2 (03 Sep 2024 18:29)  HR: 98 (04 Sep 2024 11:34) (89 - 103)  BP: 113/75 (04 Sep 2024 11:34) (106/69 - 117/81)  BP(mean): --  RR: 18 (04 Sep 2024 11:34) (18 - 18)  SpO2: 99% (04 Sep 2024 11:34) (95% - 100%)    Parameters below as of 04 Sep 2024 11:34  Patient On (Oxygen Delivery Method): room air    
Vital Signs Last 24 Hrs  T(C): 36.3 (01 Sep 2024 10:40), Max: 37.1 (31 Aug 2024 16:42)  T(F): 97.3 (01 Sep 2024 10:40), Max: 98.7 (31 Aug 2024 16:42)  HR: 81 (01 Sep 2024 10:40) (76 - 81)  BP: 126/74 (01 Sep 2024 10:40) (110/78 - 126/74)  BP(mean): --  RR: 18 (01 Sep 2024 10:40) (18 - 18)  SpO2: 97% (01 Sep 2024 10:40) (97% - 98%)    Parameters below as of 01 Sep 2024 07:41  Patient On (Oxygen Delivery Method): room air      
Vital Signs Last 24 Hrs  T(C): 36.7 (03 Sep 2024 10:44), Max: 36.7 (03 Sep 2024 10:44)  T(F): 98 (03 Sep 2024 10:44), Max: 98 (03 Sep 2024 10:44)  HR: 87 (03 Sep 2024 10:44) (87 - 91)  BP: 129/69 (03 Sep 2024 10:44) (112/78 - 129/69)  BP(mean): --  RR: 18 (02 Sep 2024 15:33) (18 - 18)  SpO2: 98% (03 Sep 2024 10:44) (96% - 98%)    Parameters below as of 03 Sep 2024 10:44  Patient On (Oxygen Delivery Method): room air

## 2024-09-05 NOTE — BH CONSULTATION LIAISON PROGRESS NOTE - NSBHFUPINTERVALCCFT_PSY_A_CORE
"I forgot to tell you... I was acting in love."
"I thought I was on God's mission to save the world."
"I was testing a theory, a reverse tracking theory."
"the backward law theory is about doing the opposite of what you want to get what you want and where you are meant to be." 
"I was testing out a theory... the backwards law theory"

## 2024-09-11 LAB
LYME IGG LINE BLOT INTERP.: NEGATIVE — SIGNIFICANT CHANGE UP
LYME IGM LINE BLOT INTERP.: NEGATIVE — SIGNIFICANT CHANGE UP
P18 AB. IGG: SIGNIFICANT CHANGE UP
P23 AB. IGG: SIGNIFICANT CHANGE UP
P23 AB. IGM: SIGNIFICANT CHANGE UP
P28 AB. IGG: SIGNIFICANT CHANGE UP
P30 AB. IGG: SIGNIFICANT CHANGE UP
P39 AB. IGG: SIGNIFICANT CHANGE UP
P39 AB. IGM: SIGNIFICANT CHANGE UP
P41 AB. IGG: SIGNIFICANT CHANGE UP
P41 AB. IGM: SIGNIFICANT CHANGE UP
P45 AB. IGG: SIGNIFICANT CHANGE UP
P58 AB. IGG: SIGNIFICANT CHANGE UP
P66 AB. IGG: SIGNIFICANT CHANGE UP
P93 AB. IGG: SIGNIFICANT CHANGE UP

## 2024-10-09 ENCOUNTER — APPOINTMENT (OUTPATIENT)
Dept: FAMILY MEDICINE | Facility: CLINIC | Age: 29
End: 2024-10-09
Payer: MEDICAID

## 2024-10-09 VITALS
HEIGHT: 70 IN | OXYGEN SATURATION: 98 % | RESPIRATION RATE: 16 BRPM | WEIGHT: 170 LBS | SYSTOLIC BLOOD PRESSURE: 110 MMHG | BODY MASS INDEX: 24.34 KG/M2 | HEART RATE: 75 BPM | TEMPERATURE: 98 F | DIASTOLIC BLOOD PRESSURE: 70 MMHG

## 2024-10-09 DIAGNOSIS — Z00.00 ENCOUNTER FOR GENERAL ADULT MEDICAL EXAMINATION W/OUT ABNORMAL FINDINGS: ICD-10-CM

## 2024-10-09 DIAGNOSIS — F31.60 BIPOLAR DISORDER, CURRENT EPISODE MIXED, UNSPECIFIED: ICD-10-CM

## 2024-10-09 PROCEDURE — G0444 DEPRESSION SCREEN ANNUAL: CPT | Mod: 59

## 2024-10-09 PROCEDURE — 99385 PREV VISIT NEW AGE 18-39: CPT

## 2024-10-09 RX ORDER — RISPERIDONE 2 MG/1
2 TABLET ORAL
Refills: 0 | Status: ACTIVE | COMMUNITY
Start: 2024-10-09

## 2024-10-17 LAB
ALBUMIN SERPL ELPH-MCNC: 4.3 G/DL
ALP BLD-CCNC: 68 U/L
ALT SERPL-CCNC: 26 U/L
ANION GAP SERPL CALC-SCNC: 12 MMOL/L
AST SERPL-CCNC: 22 U/L
BASOPHILS # BLD AUTO: 0.04 K/UL
BASOPHILS NFR BLD AUTO: 1.3 %
BILIRUB SERPL-MCNC: 1.2 MG/DL
BUN SERPL-MCNC: 11 MG/DL
CALCIUM SERPL-MCNC: 9.6 MG/DL
CHLORIDE SERPL-SCNC: 105 MMOL/L
CHOLEST SERPL-MCNC: 176 MG/DL
CO2 SERPL-SCNC: 24 MMOL/L
CREAT SERPL-MCNC: 1.19 MG/DL
EGFR: 84 ML/MIN/1.73M2
EOSINOPHIL # BLD AUTO: 0.23 K/UL
EOSINOPHIL NFR BLD AUTO: 7.2 %
ESTIMATED AVERAGE GLUCOSE: 97 MG/DL
FOLATE SERPL-MCNC: 13.9 NG/ML
GLUCOSE SERPL-MCNC: 84 MG/DL
HBA1C MFR BLD HPLC: 5 %
HCT VFR BLD CALC: 42 %
HDLC SERPL-MCNC: 65 MG/DL
HGB BLD-MCNC: 14.3 G/DL
IMM GRANULOCYTES NFR BLD AUTO: 0.3 %
LDLC SERPL CALC-MCNC: 102 MG/DL
LYMPHOCYTES # BLD AUTO: 1.44 K/UL
LYMPHOCYTES NFR BLD AUTO: 45.3 %
MAN DIFF?: NORMAL
MCHC RBC-ENTMCNC: 30.1 PG
MCHC RBC-ENTMCNC: 34 GM/DL
MCV RBC AUTO: 88.4 FL
MONOCYTES # BLD AUTO: 0.31 K/UL
MONOCYTES NFR BLD AUTO: 9.7 %
NEUTROPHILS # BLD AUTO: 1.15 K/UL
NEUTROPHILS NFR BLD AUTO: 36.2 %
NONHDLC SERPL-MCNC: 111 MG/DL
PLATELET # BLD AUTO: 216 K/UL
POTASSIUM SERPL-SCNC: 4.3 MMOL/L
PROT SERPL-MCNC: 6.7 G/DL
RBC # BLD: 4.75 M/UL
RBC # FLD: 13.4 %
SODIUM SERPL-SCNC: 141 MMOL/L
TRIGL SERPL-MCNC: 46 MG/DL
TSH SERPL-ACNC: 2.33 UIU/ML
VIT B12 SERPL-MCNC: 629 PG/ML
WBC # FLD AUTO: 3.18 K/UL

## 2025-02-03 NOTE — PROGRESS NOTE ADULT - PROVIDER SPECIALTY LIST ADULT
Hospitalist
Internal Medicine
Hospitalist
Internal Medicine
Hospitalist
Her/She

## 2025-03-04 NOTE — BH CONSULTATION LIAISON ASSESSMENT NOTE - NSACTIVEVENT_PSY_ALL_CORE
fall precautions/hearing precautions
Triggering events leading to humiliation, shame, and/or despair (e.g., Loss of relationship, financial or health status) (real or anticipated)/Current or pending social isolation/Substance intoxication or withdrawal/Recent onset of psychiatric illness

## 2025-04-03 NOTE — BH CONSULTATION LIAISON PROGRESS NOTE - NSBHCONSULTSUBST_PSY_A_CORE
[FreeTextEntry1] : Impression: S/P bunionectomy, left (Z98.890).  S/P arthrodesis, left (Z98.1).  Suture removal (Z48.02).  Treatment: Patient was instructed to perform ROM exercises of the ankle.  Patient is to continue home care instructions.  Will reevaluate in 2 weeks; at which time, she should be able to go to full weight bearing.  She can light touch with the boot.  Any questions or problems, patient is to contact the office. 
allergic reaction
no